# Patient Record
Sex: FEMALE | Race: WHITE | NOT HISPANIC OR LATINO | Employment: UNEMPLOYED | URBAN - METROPOLITAN AREA
[De-identification: names, ages, dates, MRNs, and addresses within clinical notes are randomized per-mention and may not be internally consistent; named-entity substitution may affect disease eponyms.]

---

## 2023-08-25 PROBLEM — E55.9 VITAMIN D DEFICIENCY: Status: ACTIVE | Noted: 2023-08-25

## 2023-08-25 PROBLEM — E03.9 HYPOTHYROIDISM: Status: ACTIVE | Noted: 2023-08-25

## 2023-08-25 PROBLEM — F41.9 ANXIETY: Status: ACTIVE | Noted: 2023-08-25

## 2023-08-25 PROBLEM — F32.A DEPRESSION: Status: ACTIVE | Noted: 2023-08-25

## 2023-08-25 PROBLEM — R40.1 CLOUDED CONSCIOUSNESS: Status: ACTIVE | Noted: 2023-08-25

## 2023-08-25 PROBLEM — C50.511 MALIGNANT NEOPLASM OF LOWER-OUTER QUADRANT OF RIGHT FEMALE BREAST (MULTI): Status: ACTIVE | Noted: 2023-08-25

## 2023-08-25 PROBLEM — R44.0 AUDITORY HALLUCINATIONS: Status: ACTIVE | Noted: 2023-08-25

## 2023-08-25 PROBLEM — Z86.73 HISTORY OF CVA (CEREBROVASCULAR ACCIDENT): Status: ACTIVE | Noted: 2023-08-25

## 2023-08-25 PROBLEM — T14.8XXA HEMATOMA: Status: ACTIVE | Noted: 2023-08-25

## 2023-08-25 PROBLEM — K21.9 GASTROESOPHAGEAL REFLUX DISEASE: Status: ACTIVE | Noted: 2023-08-25

## 2023-08-25 PROBLEM — K76.9 LIVER DISEASE, UNSPECIFIED: Status: ACTIVE | Noted: 2023-08-25

## 2023-08-25 PROBLEM — F10.939 ALCOHOL WITHDRAWAL SYNDROME (MULTI): Status: ACTIVE | Noted: 2023-08-25

## 2023-08-25 PROBLEM — R18.8 ASCITES: Status: ACTIVE | Noted: 2023-08-25

## 2023-08-25 PROBLEM — R10.9 ABDOMINAL PAIN: Status: ACTIVE | Noted: 2023-08-25

## 2023-08-25 PROBLEM — S80.10XA CONTUSION OF LOWER LEG: Status: ACTIVE | Noted: 2023-08-25

## 2023-08-25 PROBLEM — K70.31 ALCOHOLIC CIRRHOSIS OF LIVER WITH ASCITES (MULTI): Status: ACTIVE | Noted: 2023-08-25

## 2023-08-25 PROBLEM — R07.81 RIB PAIN: Status: ACTIVE | Noted: 2023-08-25

## 2023-08-25 PROBLEM — S09.90XA INJURY OF HEAD: Status: ACTIVE | Noted: 2023-08-25

## 2023-08-25 PROBLEM — D64.9 ANEMIA: Status: ACTIVE | Noted: 2023-08-25

## 2023-08-25 PROBLEM — K92.2 GASTROINTESTINAL HEMORRHAGE: Status: ACTIVE | Noted: 2023-08-25
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
## 2023-08-25 PROBLEM — S69.90XA INJURY OF FINGER: Status: ACTIVE | Noted: 2023-08-25

## 2023-08-25 PROBLEM — J90 PLEURAL EFFUSION: Status: ACTIVE | Noted: 2023-08-25

## 2023-08-25 PROBLEM — R06.82 TACHYPNEA: Status: ACTIVE | Noted: 2023-08-25

## 2023-08-25 PROBLEM — R44.3 HALLUCINATIONS: Status: ACTIVE | Noted: 2023-08-25

## 2023-08-25 PROBLEM — F17.200 TOBACCO USE DISORDER: Status: ACTIVE | Noted: 2023-08-25

## 2023-08-25 PROBLEM — C50.911 INFILTRATING DUCTAL CARCINOMA OF RIGHT BREAST (MULTI): Status: ACTIVE | Noted: 2023-08-25

## 2023-08-25 PROBLEM — K22.70 BARRETT'S ESOPHAGUS WITHOUT DYSPLASIA: Status: ACTIVE | Noted: 2023-08-25

## 2023-08-25 PROBLEM — S62.639A CLOSED FRACTURE OF DISTAL PHALANX OF FINGER: Status: ACTIVE | Noted: 2023-08-25

## 2023-08-25 PROBLEM — I50.9 HEART FAILURE (MULTI): Status: ACTIVE | Noted: 2023-08-25

## 2023-08-25 PROBLEM — K70.30 ALCOHOLIC CIRRHOSIS (MULTI): Status: ACTIVE | Noted: 2023-08-25

## 2023-08-25 PROBLEM — K74.60 HEPATIC CIRRHOSIS (MULTI): Status: ACTIVE | Noted: 2023-08-25

## 2023-08-25 PROBLEM — E78.49 OTHER HYPERLIPIDEMIA: Status: ACTIVE | Noted: 2023-08-25

## 2023-08-25 PROBLEM — M79.673 FOOT PAIN: Status: ACTIVE | Noted: 2023-08-25

## 2023-08-25 PROBLEM — I10 ESSENTIAL HYPERTENSION: Status: ACTIVE | Noted: 2023-08-25

## 2023-08-25 PROBLEM — K74.60 LIVER CIRRHOSIS (MULTI): Status: ACTIVE | Noted: 2023-08-25

## 2023-08-25 PROBLEM — K74.60 UNSPECIFIED CIRRHOSIS OF LIVER (MULTI): Status: ACTIVE | Noted: 2023-08-25

## 2023-08-25 RX ORDER — FUROSEMIDE 40 MG/1
40 TABLET ORAL DAILY
COMMUNITY
End: 2024-04-10 | Stop reason: SDUPTHER

## 2023-08-25 RX ORDER — LACTULOSE 10 G/10G
1 SOLUTION ORAL DAILY
COMMUNITY
End: 2024-04-10 | Stop reason: ALTCHOICE

## 2023-08-25 RX ORDER — LEVOTHYROXINE SODIUM 125 UG/1
62.5 TABLET ORAL DAILY
COMMUNITY
End: 2024-03-20

## 2023-08-25 RX ORDER — ACETAMINOPHEN 500 MG
1 TABLET ORAL
COMMUNITY

## 2023-08-25 RX ORDER — LEVOTHYROXINE SODIUM 75 UG/1
75 TABLET ORAL DAILY
COMMUNITY
End: 2024-04-10 | Stop reason: ALTCHOICE

## 2023-08-25 RX ORDER — PROPRANOLOL HYDROCHLORIDE 20 MG/1
20 TABLET ORAL DAILY
COMMUNITY

## 2023-08-25 RX ORDER — LACTULOSE 10 G/15ML
10 SOLUTION ORAL; RECTAL 3 TIMES DAILY
COMMUNITY

## 2023-08-25 RX ORDER — MELATONIN 5 MG
1 CAPSULE ORAL NIGHTLY PRN
COMMUNITY

## 2023-08-25 RX ORDER — DENOSUMAB 60 MG/ML
INJECTION SUBCUTANEOUS
COMMUNITY

## 2023-08-25 RX ORDER — LACTULOSE 10 G/10G
1 SOLUTION ORAL 3 TIMES DAILY
COMMUNITY
End: 2024-04-10 | Stop reason: ALTCHOICE

## 2023-08-25 RX ORDER — SPIRONOLACTONE 50 MG/1
50 TABLET, FILM COATED ORAL 2 TIMES DAILY
COMMUNITY

## 2023-08-25 RX ORDER — ANASTROZOLE 1 MG/1
1 TABLET ORAL DAILY
COMMUNITY
End: 2023-10-11 | Stop reason: SDUPTHER

## 2023-08-25 RX ORDER — OMEPRAZOLE 40 MG/1
1 CAPSULE, DELAYED RELEASE ORAL DAILY
COMMUNITY

## 2023-08-25 RX ORDER — FUROSEMIDE 20 MG/1
20 TABLET ORAL DAILY
COMMUNITY
End: 2024-04-10 | Stop reason: SDUPTHER

## 2023-08-25 RX ORDER — PROPRANOLOL HYDROCHLORIDE 80 MG/1
1 TABLET ORAL EVERY 12 HOURS
COMMUNITY
End: 2024-04-10 | Stop reason: SDUPTHER

## 2023-08-25 RX ORDER — RISPERIDONE 3 MG/1
3 TABLET ORAL DAILY
COMMUNITY
End: 2024-04-10 | Stop reason: ENTERED-IN-ERROR

## 2023-08-25 RX ORDER — FUROSEMIDE 20 MG/1
20 TABLET ORAL 2 TIMES DAILY
COMMUNITY

## 2023-09-24 VITALS — BODY MASS INDEX: 23.65 KG/M2 | WEIGHT: 128.53 LBS | HEIGHT: 62 IN

## 2023-09-24 RX ORDER — HEPARIN 100 UNIT/ML
500 SYRINGE INTRAVENOUS AS NEEDED
OUTPATIENT
Start: 2023-09-30

## 2023-09-24 RX ORDER — HEPARIN SODIUM,PORCINE/PF 10 UNIT/ML
50 SYRINGE (ML) INTRAVENOUS AS NEEDED
OUTPATIENT
Start: 2023-09-30

## 2023-09-30 RX ORDER — EPINEPHRINE 0.3 MG/.3ML
0.3 INJECTION SUBCUTANEOUS EVERY 5 MIN PRN
Status: CANCELLED | OUTPATIENT
Start: 2023-10-11

## 2023-09-30 RX ORDER — FAMOTIDINE 10 MG/ML
20 INJECTION INTRAVENOUS ONCE AS NEEDED
Status: CANCELLED | OUTPATIENT
Start: 2023-10-11

## 2023-09-30 RX ORDER — ALBUTEROL SULFATE 0.83 MG/ML
3 SOLUTION RESPIRATORY (INHALATION) AS NEEDED
Status: CANCELLED | OUTPATIENT
Start: 2023-10-11

## 2023-09-30 RX ORDER — DIPHENHYDRAMINE HYDROCHLORIDE 50 MG/ML
50 INJECTION INTRAMUSCULAR; INTRAVENOUS AS NEEDED
Status: CANCELLED | OUTPATIENT
Start: 2023-10-11

## 2023-10-03 ENCOUNTER — TELEPHONE (OUTPATIENT)
Dept: HEMATOLOGY/ONCOLOGY | Facility: CLINIC | Age: 53
End: 2023-10-03

## 2023-10-03 ENCOUNTER — HOSPITAL ENCOUNTER (OUTPATIENT)
Dept: RADIOLOGY | Facility: HOSPITAL | Age: 53
Discharge: HOME | End: 2023-10-03
Payer: MEDICARE

## 2023-10-03 ENCOUNTER — LAB (OUTPATIENT)
Dept: LAB | Facility: LAB | Age: 53
End: 2023-10-03
Payer: MEDICARE

## 2023-10-03 VITALS — HEIGHT: 62 IN | WEIGHT: 127 LBS | BODY MASS INDEX: 23.37 KG/M2

## 2023-10-03 DIAGNOSIS — C50.919 MALIGNANT NEOPLASM OF UNSPECIFIED SITE OF UNSPECIFIED FEMALE BREAST (MULTI): ICD-10-CM

## 2023-10-03 DIAGNOSIS — C50.919 MALIGNANT NEOPLASM OF UNSPECIFIED SITE OF UNSPECIFIED FEMALE BREAST (MULTI): Primary | ICD-10-CM

## 2023-10-03 DIAGNOSIS — C50.511 MALIGNANT NEOPLASM OF LOWER-OUTER QUADRANT OF RIGHT FEMALE BREAST, UNSPECIFIED ESTROGEN RECEPTOR STATUS (MULTI): ICD-10-CM

## 2023-10-03 LAB
25(OH)D3 SERPL-MCNC: 67 NG/ML (ref 31–100)
ALBUMIN SERPL-MCNC: 4.3 G/DL (ref 3.5–5)
ALP BLD-CCNC: 57 U/L (ref 35–125)
ALT SERPL-CCNC: 14 U/L (ref 5–40)
ANION GAP SERPL CALC-SCNC: 12 MMOL/L
AST SERPL-CCNC: 19 U/L (ref 5–40)
BASOPHILS # BLD AUTO: 0.03 X10*3/UL (ref 0–0.1)
BASOPHILS NFR BLD AUTO: 0.3 %
BILIRUB SERPL-MCNC: 0.3 MG/DL (ref 0.1–1.2)
BUN SERPL-MCNC: 9 MG/DL (ref 8–25)
CALCIUM SERPL-MCNC: 9.9 MG/DL (ref 8.5–10.4)
CHLORIDE SERPL-SCNC: 104 MMOL/L (ref 97–107)
CO2 SERPL-SCNC: 24 MMOL/L (ref 24–31)
CREAT SERPL-MCNC: 0.9 MG/DL (ref 0.4–1.6)
EOSINOPHIL # BLD AUTO: 0.06 X10*3/UL (ref 0–0.7)
EOSINOPHIL NFR BLD AUTO: 0.6 %
ERYTHROCYTE [DISTWIDTH] IN BLOOD BY AUTOMATED COUNT: 12.7 % (ref 11.5–14.5)
GFR SERPL CREATININE-BSD FRML MDRD: 76 ML/MIN/1.73M*2
GLUCOSE SERPL-MCNC: 104 MG/DL (ref 65–99)
HCT VFR BLD AUTO: 42.7 % (ref 36–46)
HGB BLD-MCNC: 15 G/DL (ref 12–16)
IMM GRANULOCYTES # BLD AUTO: 0.03 X10*3/UL (ref 0–0.7)
IMM GRANULOCYTES NFR BLD AUTO: 0.3 % (ref 0–0.9)
LYMPHOCYTES # BLD AUTO: 1.02 X10*3/UL (ref 1.2–4.8)
LYMPHOCYTES NFR BLD AUTO: 10.9 %
MCH RBC QN AUTO: 31.1 PG (ref 26–34)
MCHC RBC AUTO-ENTMCNC: 35.1 G/DL (ref 32–36)
MCV RBC AUTO: 88 FL (ref 80–100)
MONOCYTES # BLD AUTO: 0.63 X10*3/UL (ref 0.1–1)
MONOCYTES NFR BLD AUTO: 6.8 %
NEUTROPHILS # BLD AUTO: 7.56 X10*3/UL (ref 1.2–7.7)
NEUTROPHILS NFR BLD AUTO: 81.1 %
NRBC BLD-RTO: 0 /100 WBCS (ref 0–0)
PLATELET # BLD AUTO: 234 X10*3/UL (ref 150–450)
PMV BLD AUTO: 11.6 FL (ref 7.5–11.5)
POTASSIUM SERPL-SCNC: 4.3 MMOL/L (ref 3.4–5.1)
PROT SERPL-MCNC: 7.2 G/DL (ref 5.9–7.9)
RBC # BLD AUTO: 4.83 X10*6/UL (ref 4–5.2)
SODIUM SERPL-SCNC: 140 MMOL/L (ref 133–145)
WBC # BLD AUTO: 9.3 X10*3/UL (ref 4.4–11.3)

## 2023-10-03 PROCEDURE — 82374 ASSAY BLOOD CARBON DIOXIDE: CPT | Performed by: INTERNAL MEDICINE

## 2023-10-03 PROCEDURE — 77067 SCR MAMMO BI INCL CAD: CPT | Mod: 50

## 2023-10-03 PROCEDURE — 85025 COMPLETE CBC W/AUTO DIFF WBC: CPT | Performed by: INTERNAL MEDICINE

## 2023-10-03 PROCEDURE — 77063 BREAST TOMOSYNTHESIS BI: CPT | Mod: 50

## 2023-10-03 PROCEDURE — 36415 COLL VENOUS BLD VENIPUNCTURE: CPT

## 2023-10-03 PROCEDURE — 82306 VITAMIN D 25 HYDROXY: CPT | Performed by: INTERNAL MEDICINE

## 2023-10-10 PROBLEM — M81.8 OTHER OSTEOPOROSIS WITHOUT CURRENT PATHOLOGICAL FRACTURE: Status: ACTIVE | Noted: 2023-10-10

## 2023-10-10 NOTE — PROGRESS NOTES
Patient ID: Evette Abdul is a 53 y.o. female.    The patient presents to clinic today for her history of breast cancer.     Cancer Staging   Infiltrating ductal carcinoma of right breast (CMS/HCC)  Staging form: Breast, AJCC 8th Edition  - Clinical stage from 11/1/2021: Stage IA (cT1b, cN0, cM0, G1, ER+, UT+, HER2-) - Unsigned      Diagnostic/Therapeutic History:  pT1b pN0 (stage IA) right breast cancer  1. S/p t treatment for a 0.4 cm invasive ductal carcinoma, ER 95%, UT 90%, and HER-2-negative, grade 1. This was diagnosed in November of 2020.   2. S/p adjuvant radiation therapy, completed 2/26/21.  3. Started on anastrozole 3/2021.  4. She has osteoporosis and is on Prolia q6 months, initiated 3/2021.    History of Present Illness (HPI)/Interval History:  Ms. Abdul presents for presents today for follow-up, treatment and surveillance. She is compliant on anastrozole.     She denies any new breast cancer concerns.     She denies any chest pain or breathing issues.     She denies any vision changes or headache issues, dizziness, loss of balance or falls.     She denies any new or unexplained bone aches or pains.    She denies any skin lesions or masses.    She reports a normal appetite and normal bowel movements.    She denies any issues with sleep, fatigue, hot flashes or mood swings.     Review of Systems:  14-point ROS otherwise negative, as per HPI.    Past Medical History:   Diagnosis Date    Breast cancer (CMS/HCC)     Hx antineoplastic chemo     Other specified health status     No pertinent past surgical history    Personal history of irradiation     Personal history of malignant neoplasm of cervix uteri     History of malignant neoplasm of cervix uteri    Personal history of other diseases of the circulatory system     History of hypertension    Personal history of transient ischemic attack (TIA), and cerebral infarction without residual deficits     History of stroke       Past Surgical History:    Procedure Laterality Date    BI MAMMO GUIDED LOCALIZATION BREAST RIGHT Right 11/25/2020    BI MAMMO GUIDED LOCALIZATION BREAST RIGHT [facility]    BI US GUIDED BREAST LOCALIZATION AND BIOPSY LEFT Left 08/29/2019    BI US GUIDED BREAST LOCALIZATION AND BIOPSY LEFT [facility]    BI US GUIDED BREAST LOCALIZATION AND BIOPSY RIGHT Right 11/09/2020    BI US GUIDED BREAST LOCALIZATION AND BIOPSY RIGHT [facility]    BREAST LUMPECTOMY      US GUIDED ABDOMINAL PARACENTESIS  07/19/2014    US GUIDED ABDOMINAL PARACENTESIS [facility]    US GUIDED ABDOMINAL PARACENTESIS  07/22/2014    US GUIDED ABDOMINAL PARACENTESIS [facility]    US GUIDED ABDOMINAL PARACENTESIS  01/13/2015    US GUIDED ABDOMINAL PARACENTESIS [facility]    US GUIDED ABDOMINAL PARACENTESIS  02/03/2015    US GUIDED ABDOMINAL PARACENTESIS [facility]    US GUIDED ABDOMINAL PARACENTESIS  02/25/2015    US GUIDED ABDOMINAL PARACENTESIS [facility]       Social History     Socioeconomic History    Marital status: Single     Spouse name: Not on file    Number of children: Not on file    Years of education: Not on file    Highest education level: Not on file   Occupational History    Not on file   Tobacco Use    Smoking status: Every Day     Types: Cigarettes    Smokeless tobacco: Not on file   Substance and Sexual Activity    Alcohol use: Not on file    Drug use: Not on file    Sexual activity: Not on file   Other Topics Concern    Not on file   Social History Narrative    Not on file     Social Determinants of Health     Financial Resource Strain: Not on file   Food Insecurity: Not on file   Transportation Needs: Not on file   Physical Activity: Not on file   Stress: Not on file   Social Connections: Not on file   Intimate Partner Violence: Not on file   Housing Stability: Not on file       Allergies   Allergen Reactions    Acetaminophen Unknown    Aspirin Unknown    Codeine Unknown    Ibuprofen  Unknown    Tramadol Unknown         Current Outpatient Medications:     anastrozole (Arimidex) 1 mg tablet, Take 1 tablet (1 mg total) by mouth once daily., Disp: , Rfl:     calcium carbonate-vitamin D3 600 mg-20 mcg (800 unit) tablet, Take 1 tablet by mouth once daily with a meal., Disp: , Rfl:     denosumab (Prolia) 60 mg/mL syringe, Inject under the skin., Disp: , Rfl:     furosemide (Lasix) 20 mg tablet, Take 1 tablet (20 mg) by mouth 2 times a day., Disp: , Rfl:     furosemide (Lasix) 20 mg tablet, Take 1 tablet (20 mg) by mouth once daily., Disp: , Rfl:     furosemide (Lasix) 40 mg tablet, Take 1 tablet (40 mg) by mouth once daily., Disp: , Rfl:     lactulose (Kristalose) 10 gram packet, Take 1 packet (10 g) by mouth once daily., Disp: , Rfl:     lactulose (Kristalose) 10 gram packet, Take 1 packet (10 g) by mouth 3 times a day., Disp: , Rfl:     lactulose 20 gram/30 mL oral solution, Take 15 mL (10 g) by mouth 3 times a day., Disp: , Rfl:     levothyroxine (Synthroid, Levoxyl) 125 mcg tablet, Take 0.5 tablets (62.5 mcg) by mouth once daily., Disp: , Rfl:     levothyroxine (Synthroid, Levoxyl) 75 mcg tablet, Take 1 tablet (75 mcg) by mouth once daily., Disp: , Rfl:     melatonin 5 mg capsule, Take 1 capsule by mouth as needed at bedtime., Disp: , Rfl:     omeprazole (PriLOSEC) 40 mg DR capsule, Take 1 capsule (40 mg) by mouth once daily., Disp: , Rfl:     propranolol (Inderal) 20 mg tablet, Take 1 tablet (20 mg) by mouth once daily., Disp: , Rfl:     propranolol (Inderal) 80 mg tablet, Take 1 tablet (80 mg) by mouth every 12 hours., Disp: , Rfl:     risperiDONE (RisperDAL) 3 mg tablet, Take 1 tablet (3 mg) by mouth once daily., Disp: , Rfl:     spironolactone (Aldactone) 50 mg tablet, Take 1 tablet (50 mg) by mouth 2 times a day., Disp: , Rfl:      Objective    BSA: There is no height or weight on file to calculate BSA.  There were no vitals taken for this visit.    ECOG Performance Status:  ECOG performance  status: Asymptomatic    Physical Exam  Vitals reviewed.   Constitutional:       General: She is awake. She is not in acute distress.     Appearance: Normal appearance. She is not ill-appearing.   HENT:      Mouth/Throat:      Pharynx: Oropharynx is clear. No oropharyngeal exudate.   Eyes:      General: No scleral icterus.     Conjunctiva/sclera: Conjunctivae normal.   Neck:      Trachea: Trachea and phonation normal. No tracheal tenderness.   Cardiovascular:      Rate and Rhythm: Normal rate and regular rhythm.      Heart sounds: No murmur heard.     No friction rub. No gallop.   Pulmonary:      Effort: Pulmonary effort is normal. No respiratory distress.      Breath sounds: Normal breath sounds. No stridor. No wheezing, rhonchi or rales.   Chest:      Chest wall: No mass, lacerations, deformity or tenderness.   Breasts:     Right: Skin change (skin dimpling along incision site) present. No swelling, mass, nipple discharge or tenderness.      Left: No swelling, mass, nipple discharge, skin change or tenderness.      Comments: S/p right lumpectomy   Abdominal:      General: Abdomen is flat. There is no distension.      Palpations: Abdomen is soft. There is no mass.      Tenderness: There is no abdominal tenderness.   Lymphadenopathy:      Cervical: No cervical adenopathy.      Upper Body:      Right upper body: No supraclavicular, axillary or pectoral adenopathy.      Left upper body: No supraclavicular, axillary or pectoral adenopathy.   Skin:     General: Skin is warm and dry.      Coloration: Skin is not jaundiced.      Findings: No lesion or rash.   Neurological:      General: No focal deficit present.      Mental Status: She is alert and oriented to person, place, and time.      Motor: No weakness.      Gait: Gait normal.   Psychiatric:         Mood and Affect: Mood normal.         Thought Content: Thought content normal.         Judgment: Judgment normal.         Laboratory Data:  Lab Results   Component Value  Date    WBC 9.3 10/03/2023    HGB 15.0 10/03/2023    HCT 42.7 10/03/2023    MCV 88 10/03/2023     10/03/2023       Chemistry    Lab Results   Component Value Date/Time     10/03/2023 1504    K 4.3 10/03/2023 1504     10/03/2023 1504    CO2 24 10/03/2023 1504    BUN 9 10/03/2023 1504    CREATININE 0.90 10/03/2023 1504    Lab Results   Component Value Date/Time    CALCIUM 9.9 10/03/2023 1504    ALKPHOS 57 10/03/2023 1504    AST 19 10/03/2023 1504    ALT 14 10/03/2023 1504    BILITOT 0.3 10/03/2023 1504             Radiology:  BI mammo bilateral screening tomosynthesis  Narrative: Interpreted By:  Alana Denny,   STUDY:  BI MAMMO BILATERAL SCREENING TOMOSYNTHESIS;  10/3/2023 2:32 pm      ACCESSION NUMBER(S):  [id number]      ORDERING CLINICIAN:  PATRICE BORDEN      INDICATION:  Screening. Personal history of breast carcinoma      COMPARISON:  10/03/2022 05/02/2022, 10/01/2021      TECHNIQUE:  Digital routine screening MLO and CC projections were obtained  bilaterally. Tomosynthesis was also utilized. The images were  processed utilizing computer aided detection system.      FINDINGS:  Density:  There are areas of scattered fibroglandular tissue.      Within the posterior depth of the right breast laterally, there is  postlumpectomy change identified. Benign calcifications are present  within the right breast. On the left, there is biopsy clip seen.  Benign density within the left axillary tail is present.  No  suspicious masses or calcifications are identified.      ACR breast density category B      Impression: No mammographic evidence of malignancy.      BI-RADS CATEGORY:      BI-RADS Category:  2 Benign.  Recommendation:  Routine Screening Mammogram in 1 Year.  Recommended Date:  1 Year.  Laterality:  Bilateral.      For any future breast imaging appointments, please call [phone]).          MACRO:  None      Signed by: Alana Denny 10/3/2023 2:52 PM  Dictation workstation:    [id number]       BI mammo bilateral screening tomosynthesis 10/03/2023    Narrative  Interpreted By:  Alana Denny,  STUDY:  BI MAMMO BILATERAL SCREENING TOMOSYNTHESIS;  10/3/2023 2:32 pm    ACCESSION NUMBER(S):  [id number]    ORDERING CLINICIAN:  PATRICE BORDEN    INDICATION:  Screening. Personal history of breast carcinoma    COMPARISON:  10/03/2022 05/02/2022, 10/01/2021    TECHNIQUE:  Digital routine screening MLO and CC projections were obtained  bilaterally. Tomosynthesis was also utilized. The images were  processed utilizing computer aided detection system.    FINDINGS:  Density:  There are areas of scattered fibroglandular tissue.    Within the posterior depth of the right breast laterally, there is  postlumpectomy change identified. Benign calcifications are present  within the right breast. On the left, there is biopsy clip seen.  Benign density within the left axillary tail is present.  No  suspicious masses or calcifications are identified.    ACR breast density category B    Impression  No mammographic evidence of malignancy.    BI-RADS CATEGORY:    BI-RADS Category:  2 Benign.  Recommendation:  Routine Screening Mammogram in 1 Year.  Recommended Date:  1 Year.  Laterality:  Bilateral.    For any future breast imaging appointments, please call [phone]  ([phone]).      MACRO:  None    Signed by: Alana Denny 10/3/2023 2:52 PM  Dictation workstation:   [id number]          Assessment/Plan:    Evette Abdul is a 53 y.o. female with a history of right sided breast cancer, who presents today follow-up evaluation.    Assess/Plan SmartLinks:   Problem List Items Addressed This Visit             ICD-10-CM    Malignant neoplasm of lower-outer quadrant of right female breast (CMS/HCC) - Primary C50.511    Relevant Medications    anastrozole (Arimidex) 1 mg tablet    Other Relevant Orders    CBC and Auto Differential    Comprehensive Metabolic Panel    Infusion Appointment Request [facility]     Clinic Appointment Request Follow up; ROLANDO HUERTA     Other Visit Diagnoses         Codes    Osteopenia, unspecified location     M85.80    Relevant Orders    Infusion Appointment Request [facility]            Disposition.  - RTC 6 months, same day as prolia injection with Rolando Huerta PA-C   - She was given our contact information and instructed to call with concerns/questions in the interim.    No orders of the defined types were placed in this encounter.            Rolando Huerta PA-C  Hematology and Medical Oncology  [facility]

## 2023-10-11 ENCOUNTER — INFUSION (OUTPATIENT)
Dept: HEMATOLOGY/ONCOLOGY | Facility: CLINIC | Age: 53
End: 2023-10-11
Payer: MEDICARE

## 2023-10-11 ENCOUNTER — OFFICE VISIT (OUTPATIENT)
Dept: HEMATOLOGY/ONCOLOGY | Facility: CLINIC | Age: 53
End: 2023-10-11
Payer: MEDICARE

## 2023-10-11 VITALS
RESPIRATION RATE: 18 BRPM | TEMPERATURE: 96.6 F | DIASTOLIC BLOOD PRESSURE: 70 MMHG | HEIGHT: 62 IN | HEART RATE: 82 BPM | BODY MASS INDEX: 22.43 KG/M2 | OXYGEN SATURATION: 98 % | WEIGHT: 121.91 LBS | SYSTOLIC BLOOD PRESSURE: 102 MMHG

## 2023-10-11 DIAGNOSIS — C50.911 INFILTRATING DUCTAL CARCINOMA OF RIGHT BREAST (MULTI): ICD-10-CM

## 2023-10-11 DIAGNOSIS — C50.511 MALIGNANT NEOPLASM OF LOWER-OUTER QUADRANT OF RIGHT FEMALE BREAST, UNSPECIFIED ESTROGEN RECEPTOR STATUS (MULTI): ICD-10-CM

## 2023-10-11 DIAGNOSIS — M85.80 OSTEOPENIA, UNSPECIFIED LOCATION: ICD-10-CM

## 2023-10-11 DIAGNOSIS — C50.511 MALIGNANT NEOPLASM OF LOWER-OUTER QUADRANT OF RIGHT FEMALE BREAST, UNSPECIFIED ESTROGEN RECEPTOR STATUS (MULTI): Primary | ICD-10-CM

## 2023-10-11 PROCEDURE — 3078F DIAST BP <80 MM HG: CPT

## 2023-10-11 PROCEDURE — 99214 OFFICE O/P EST MOD 30 MIN: CPT | Mod: 25

## 2023-10-11 PROCEDURE — 2500000004 HC RX 250 GENERAL PHARMACY W/ HCPCS (ALT 636 FOR OP/ED): Mod: JZ,JG,SE | Performed by: INTERNAL MEDICINE

## 2023-10-11 PROCEDURE — 99214 OFFICE O/P EST MOD 30 MIN: CPT

## 2023-10-11 PROCEDURE — 96372 THER/PROPH/DIAG INJ SC/IM: CPT

## 2023-10-11 PROCEDURE — 3074F SYST BP LT 130 MM HG: CPT

## 2023-10-11 RX ORDER — EPINEPHRINE 0.3 MG/.3ML
0.3 INJECTION SUBCUTANEOUS EVERY 5 MIN PRN
Status: CANCELLED | OUTPATIENT
Start: 2024-04-08

## 2023-10-11 RX ORDER — FAMOTIDINE 10 MG/ML
20 INJECTION INTRAVENOUS ONCE AS NEEDED
Status: CANCELLED | OUTPATIENT
Start: 2024-04-08

## 2023-10-11 RX ORDER — ALBUTEROL SULFATE 0.83 MG/ML
3 SOLUTION RESPIRATORY (INHALATION) AS NEEDED
Status: CANCELLED | OUTPATIENT
Start: 2024-04-08

## 2023-10-11 RX ORDER — DIPHENHYDRAMINE HYDROCHLORIDE 50 MG/ML
50 INJECTION INTRAMUSCULAR; INTRAVENOUS AS NEEDED
Status: CANCELLED | OUTPATIENT
Start: 2024-04-08

## 2023-10-11 RX ORDER — ANASTROZOLE 1 MG/1
1 TABLET ORAL DAILY
Qty: 90 TABLET | Refills: 3 | Status: SHIPPED | OUTPATIENT
Start: 2023-10-11 | End: 2023-10-23 | Stop reason: SDUPTHER

## 2023-10-11 RX ADMIN — DENOSUMAB 60 MG: 60 INJECTION SUBCUTANEOUS at 15:20

## 2023-10-11 ASSESSMENT — PATIENT HEALTH QUESTIONNAIRE - PHQ9
1. LITTLE INTEREST OR PLEASURE IN DOING THINGS: NOT AT ALL
2. FEELING DOWN, DEPRESSED OR HOPELESS: NOT AT ALL
SUM OF ALL RESPONSES TO PHQ9 QUESTIONS 1 AND 2: 0

## 2023-10-11 ASSESSMENT — PAIN SCALES - GENERAL: PAINLEVEL: 0-NO PAIN

## 2023-10-11 ASSESSMENT — ENCOUNTER SYMPTOMS
LOSS OF SENSATION IN FEET: 0
DEPRESSION: 0
OCCASIONAL FEELINGS OF UNSTEADINESS: 0

## 2023-10-11 ASSESSMENT — COLUMBIA-SUICIDE SEVERITY RATING SCALE - C-SSRS
6. HAVE YOU EVER DONE ANYTHING, STARTED TO DO ANYTHING, OR PREPARED TO DO ANYTHING TO END YOUR LIFE?: NO
1. IN THE PAST MONTH, HAVE YOU WISHED YOU WERE DEAD OR WISHED YOU COULD GO TO SLEEP AND NOT WAKE UP?: NO
2. HAVE YOU ACTUALLY HAD ANY THOUGHTS OF KILLING YOURSELF?: NO

## 2023-10-11 NOTE — PROGRESS NOTES
Subjective   Patient ID: Evette Abdul is a 53 y.o. female who presents for No chief complaint on file..  HPI  Patient was last in office on 11/01/2022 for a breast examination, she had a normal exam and imaging.   Patient had a diagnositc screening mammogram on 09/17/2023 with comparison imaging to 10/03/2022 and 05/02/2022 it demonstrated; there are areas of scattered fibroglandular tissue, within the posterior depth of the right breast laterally, there is postlumpectomy changes identified. Benign calcifications are present within the right breast. No suspicious masses or calcifications are identified. Category 2.   Patient last saw Dr. Hirsch on 10/11/2023 she continues on anastrozole 1 mg daily and Prolia every 6 months.     Past Medical History:   Diagnosis Date    Ascites     Montes's syndrome     Breast cancer (CMS/HCC) 10/2020    Right Breast IDC    Hx antineoplastic chemo     Hypothyroidism     Other specified health status     No pertinent past surgical history    Personal history of irradiation     Personal history of malignant neoplasm of cervix uteri     History of malignant neoplasm of cervix uteri    Personal history of other diseases of the circulatory system     History of hypertension    Personal history of transient ischemic attack (TIA), and cerebral infarction without residual deficits     History of stroke      Past Surgical History:   Procedure Laterality Date    BI MAMMO GUIDED LOCALIZATION BREAST RIGHT Right 11/25/2020    BI MAMMO GUIDED LOCALIZATION BREAST RIGHT [facility]    BI US GUIDED BREAST LOCALIZATION AND BIOPSY LEFT Left 08/29/2019    BI US GUIDED BREAST LOCALIZATION AND BIOPSY LEFT [facility]    BI US GUIDED BREAST LOCALIZATION AND BIOPSY RIGHT Right 11/09/2020    BI US GUIDED BREAST LOCALIZATION AND BIOPSY RIGHT [facility]    BREAST LUMPECTOMY      LEG SURGERY      left leg fracture repair    US GUIDED ABDOMINAL PARACENTESIS  07/19/2014    US GUIDED  ABDOMINAL PARACENTESIS [facility]    US GUIDED ABDOMINAL PARACENTESIS  07/22/2014    US GUIDED ABDOMINAL PARACENTESIS [facility]    US GUIDED ABDOMINAL PARACENTESIS  01/13/2015    US GUIDED ABDOMINAL PARACENTESIS [facility]    US GUIDED ABDOMINAL PARACENTESIS  02/03/2015    US GUIDED ABDOMINAL PARACENTESIS [facility]    US GUIDED ABDOMINAL PARACENTESIS  02/25/2015    US GUIDED ABDOMINAL PARACENTESIS [facility]      Family History   Problem Relation Name Age of Onset    Anxiety disorder Mother      Arthritis Mother      Other (cardiac disorder) Mother      Depression Mother      Hypertension Mother      Hypothyroidism Mother      Skin cancer Mother      Other (cardiac disorder) Father      Skin cancer Father      Diabetes Other Grandmother     Lung cancer Other Aunt       Allergies   Allergen Reactions    Acetaminophen Unknown    Aspirin Unknown    Codeine Unknown    Ibuprofen Unknown    Tramadol Unknown      Review of Systems   Constitutional:  Negative for appetite change, fever and unexpected weight change.   HENT:  Negative for congestion and trouble swallowing.    Respiratory:  Negative for cough and shortness of breath.    Cardiovascular:  Negative for chest pain and palpitations.   Gastrointestinal:  Negative for abdominal pain, diarrhea and nausea.   Genitourinary:  Negative for difficulty urinating and dysuria.   Musculoskeletal:  Negative for back pain and gait problem.   Skin:  Negative for color change and rash.   Neurological:  Negative for dizziness, speech difficulty and headaches.   Hematological:  Does not bruise/bleed easily.   Psychiatric/Behavioral:  Negative for confusion and suicidal ideas.          Objective   Physical Exam  Physical Exam:  GENERAL APPEARANCE: Patient appears in no acute distress.   EYES: Sclera non-icteric, PERRLA.   ENT Normal appearance of ears and nose.   NECK/THYROID: Neck: no masses. Thyroid: no masses.   LYMPH NODES: No  cervical or supraclavicular lymphadenopathy.   CARDIOVASCULAR Heart: RRR, no murmurs; Carotid bruits: none; Peripheral edema: none.   RESPIRATORY: Lungs: Bilateral inspiratory and expiratory wheezing; no respiratory distress.   BREASTS: Exam done both in upright and supine position. On inspection no skin dimpling, no peau d'orange; Masses: none palpable in either breast.  Axillary lymphadenopathy: none.   GI (ABDOMEN) No intraabdominal mass appreciated, no hepatosplenomegaly; Hernia: none; Tenderness: none.   PSYCH: Patient oriented to time, place and person, normal affect.      Assessment/Plan   Problem List Items Addressed This Visit             ICD-10-CM    Malignant neoplasm of lower-outer quadrant of right female breast (CMS/HCC) - Primary C50.511     Patient with normal breast exam normal imaging.  Patient over 2 years out from her breast cancer diagnosis.  Therefore patient needs bilateral mammogram in 1 year with a repeat check by me at that time             BREAST HISTORY:     Patient has a history of a previous left breast core biopsy.  The patient is s/p a screening mammogram with pierce on 10/24/20 that in the right breast showed a possible developing NEW asymmetry right breast at 9 o'clock posterior depth.  Diagnostic of the right revealed a roughly 5 mm irregulary marginated focal asymmetric density betwwen 8- and 9 o'clock at the junction of the mid and posterior depths.  Ultrasound identified a solid, taller than wide, 4 x 4 x 76 mm hypoechoic mass with posterior shadowing at 8 o'clock, 5 cm DFN.    She underwent a US-guided biopsy right breast at the 8 o'clock position that revealed invasive ductal carcinoma, with multiple core biopsy pieces measuring 0.4 cm in greatest dimension, grade 1 of 3, ER/MI postive, [id number] negative.  There was concordance.   The patient is s/p right breast wire localization lumpectomy with a sentinel node biopsy on 11/25/20.  Two sentinel nodes were excised. Three lymph  nodes were negative for metastasis.  Right breast tissue revealed breast parenchyma with no residual malignancy identified. Margins were clean.  She underwent radiation therapy with Dr. Flynn from 2/5/21 - 2/26/21.  She was initiated on to anastrozole mid March, then Prolia as well as of 3/25/21.  Status post a 3D diagnostic bilateral mammogram on 10/3/22, compared to diagnostic imaging on 10/1/21, wire localization on 11/25/20, biopsy on 11/4/20, diagnostic and ultrasound on 10/30/20 and a screening on 10/24/20.  HETEROGENEOUSLY DENSE breast tissue; biopsy clip outer left breast; postop scarring upper outer right breast. No other findings.  Birads category 2.  Films were reviewed.   Normal imaging and exam with her last visit on 5/12/22. She will move to yearly imaging and exams at this point.  She is continuing with anastrozole per Dr. Hirsch, who she saw on 10/6/22.  Previous breast biopsy: yes, left breast, Menarche: 11, Age of first delivery: Nulliparous, Breastfeed: No, Menopause: 45, HRT: No, Family history of breast cancer: No, Family history of ovarian cancer: No, Family history of pancreatic cancer: No.

## 2023-10-12 ENCOUNTER — OFFICE VISIT (OUTPATIENT)
Dept: SURGERY | Facility: CLINIC | Age: 53
End: 2023-10-12
Payer: MEDICARE

## 2023-10-12 VITALS
WEIGHT: 121 LBS | HEIGHT: 63 IN | OXYGEN SATURATION: 98 % | DIASTOLIC BLOOD PRESSURE: 81 MMHG | HEART RATE: 85 BPM | RESPIRATION RATE: 21 BRPM | BODY MASS INDEX: 21.44 KG/M2 | TEMPERATURE: 97.9 F | SYSTOLIC BLOOD PRESSURE: 108 MMHG

## 2023-10-12 DIAGNOSIS — C50.511 MALIGNANT NEOPLASM OF LOWER-OUTER QUADRANT OF RIGHT BREAST OF FEMALE, ESTROGEN RECEPTOR POSITIVE (MULTI): Primary | ICD-10-CM

## 2023-10-12 DIAGNOSIS — Z17.0 MALIGNANT NEOPLASM OF LOWER-OUTER QUADRANT OF RIGHT BREAST OF FEMALE, ESTROGEN RECEPTOR POSITIVE (MULTI): Primary | ICD-10-CM

## 2023-10-12 PROCEDURE — 99214 OFFICE O/P EST MOD 30 MIN: CPT | Performed by: SURGERY

## 2023-10-12 PROCEDURE — 3074F SYST BP LT 130 MM HG: CPT | Performed by: SURGERY

## 2023-10-12 PROCEDURE — 3079F DIAST BP 80-89 MM HG: CPT | Performed by: SURGERY

## 2023-10-12 ASSESSMENT — ENCOUNTER SYMPTOMS
PALPITATIONS: 0
COLOR CHANGE: 0
DIZZINESS: 0
CONFUSION: 0
COUGH: 0
UNEXPECTED WEIGHT CHANGE: 0
TROUBLE SWALLOWING: 0
BRUISES/BLEEDS EASILY: 0
ABDOMINAL PAIN: 0
HEADACHES: 0
NAUSEA: 0
APPETITE CHANGE: 0
DIFFICULTY URINATING: 0
FEVER: 0
BACK PAIN: 0
SPEECH DIFFICULTY: 0
DYSURIA: 0
SHORTNESS OF BREATH: 0
DIARRHEA: 0

## 2023-10-12 ASSESSMENT — PAIN SCALES - GENERAL: PAINLEVEL: 0-NO PAIN

## 2023-10-12 NOTE — ASSESSMENT & PLAN NOTE
Patient with normal breast exam normal imaging.  Patient over 2 years out from her breast cancer diagnosis.  Therefore patient needs bilateral mammogram in 1 year with a repeat check by me at that time

## 2023-10-23 DIAGNOSIS — C50.511 MALIGNANT NEOPLASM OF LOWER-OUTER QUADRANT OF RIGHT FEMALE BREAST, UNSPECIFIED ESTROGEN RECEPTOR STATUS (MULTI): ICD-10-CM

## 2023-10-23 RX ORDER — ANASTROZOLE 1 MG/1
1 TABLET ORAL DAILY
Qty: 90 TABLET | Refills: 3 | Status: SHIPPED | OUTPATIENT
Start: 2023-10-23

## 2023-11-01 ENCOUNTER — TELEPHONE (OUTPATIENT)
Dept: PRIMARY CARE | Facility: CLINIC | Age: 53
End: 2023-11-01
Payer: MEDICARE

## 2023-11-01 NOTE — TELEPHONE ENCOUNTER
Pt jazm stating her pharmacy has changed to [facility] please update pharmacy

## 2024-03-19 DIAGNOSIS — E03.9 HYPOTHYROIDISM, UNSPECIFIED TYPE: ICD-10-CM

## 2024-03-20 RX ORDER — LEVOTHYROXINE SODIUM 125 UG/1
62.5 TABLET ORAL DAILY
Qty: 50 TABLET | Refills: 2 | Status: SHIPPED | OUTPATIENT
Start: 2024-03-20

## 2024-04-03 ENCOUNTER — OFFICE VISIT (OUTPATIENT)
Dept: GASTROENTEROLOGY | Facility: HOSPITAL | Age: 54
End: 2024-04-03
Payer: MEDICARE

## 2024-04-03 VITALS
DIASTOLIC BLOOD PRESSURE: 73 MMHG | WEIGHT: 122 LBS | HEIGHT: 62 IN | HEART RATE: 75 BPM | OXYGEN SATURATION: 100 % | SYSTOLIC BLOOD PRESSURE: 116 MMHG | BODY MASS INDEX: 22.45 KG/M2 | TEMPERATURE: 97.3 F

## 2024-04-03 DIAGNOSIS — K70.31 ALCOHOLIC CIRRHOSIS OF LIVER WITH ASCITES (MULTI): ICD-10-CM

## 2024-04-03 DIAGNOSIS — K22.70 BARRETT'S ESOPHAGUS WITHOUT DYSPLASIA: Primary | ICD-10-CM

## 2024-04-03 PROCEDURE — 99204 OFFICE O/P NEW MOD 45 MIN: CPT | Performed by: INTERNAL MEDICINE

## 2024-04-03 PROCEDURE — 3074F SYST BP LT 130 MM HG: CPT | Performed by: INTERNAL MEDICINE

## 2024-04-03 PROCEDURE — 3078F DIAST BP <80 MM HG: CPT | Performed by: INTERNAL MEDICINE

## 2024-04-03 PROCEDURE — 99214 OFFICE O/P EST MOD 30 MIN: CPT | Performed by: INTERNAL MEDICINE

## 2024-04-03 ASSESSMENT — PAIN SCALES - GENERAL: PAINLEVEL: 0-NO PAIN

## 2024-04-03 NOTE — PROGRESS NOTES
Subjective   Patient ID: Evette Abdul is a 53 y.o. female who presents for Abdominal Pain and ewing's.  HPI 52 yo hx of breast ca with lumpectomy and RT. In remission. Hx of alc cirrhosis. Says she has been abstinent. Last EGD showed no varices or portal HTN however she says she had gastric varices. Has known LSBE. Last surveyed in Dec 2019    Review of Systems See intake forme    Objective   Physical Exam Slight temporal wasting. Lungs cl, cor regular, did not appreciate ascites.     Assessment/Plan     Has hx of arrhythmia for which she is on rate control with inderal. Also on lasix and aldactone for hx ascites. Bianka A cirrhosis. Has LSBE. Discussed need for surveillance. Will schedule.        Fabrice Smith MD 04/03/24 12:13 PM

## 2024-04-04 DIAGNOSIS — K22.70 BARRETT'S ESOPHAGUS WITHOUT DYSPLASIA: ICD-10-CM

## 2024-04-04 DIAGNOSIS — K70.31 ALCOHOLIC CIRRHOSIS OF LIVER WITH ASCITES (MULTI): Primary | ICD-10-CM

## 2024-04-05 ENCOUNTER — LAB (OUTPATIENT)
Dept: LAB | Facility: LAB | Age: 54
End: 2024-04-05
Payer: MEDICARE

## 2024-04-05 DIAGNOSIS — K22.70 BARRETT'S ESOPHAGUS WITHOUT DYSPLASIA: Primary | ICD-10-CM

## 2024-04-05 DIAGNOSIS — C50.511 MALIGNANT NEOPLASM OF LOWER-OUTER QUADRANT OF RIGHT FEMALE BREAST, UNSPECIFIED ESTROGEN RECEPTOR STATUS (MULTI): ICD-10-CM

## 2024-04-05 DIAGNOSIS — K70.31 ALCOHOLIC CIRRHOSIS OF LIVER WITH ASCITES (MULTI): Primary | ICD-10-CM

## 2024-04-05 LAB
ALBUMIN SERPL-MCNC: 4.6 G/DL (ref 3.5–5)
ALP BLD-CCNC: 54 U/L (ref 35–125)
ALT SERPL-CCNC: 12 U/L (ref 5–40)
ANION GAP SERPL CALC-SCNC: 15 MMOL/L
AST SERPL-CCNC: 18 U/L (ref 5–40)
BASOPHILS # BLD AUTO: 0.04 X10*3/UL (ref 0–0.1)
BASOPHILS NFR BLD AUTO: 0.5 %
BILIRUB DIRECT SERPL-MCNC: <0.2 MG/DL (ref 0–0.2)
BILIRUB SERPL-MCNC: 0.6 MG/DL (ref 0.1–1.2)
BUN SERPL-MCNC: 14 MG/DL (ref 8–25)
CA-I BLD-SCNC: 1.27 MMOL/L (ref 1.1–1.33)
CALCIUM SERPL-MCNC: 10.4 MG/DL (ref 8.5–10.4)
CHLORIDE SERPL-SCNC: 100 MMOL/L (ref 97–107)
CO2 SERPL-SCNC: 25 MMOL/L (ref 24–31)
CREAT SERPL-MCNC: 0.9 MG/DL (ref 0.4–1.6)
EGFRCR SERPLBLD CKD-EPI 2021: 76 ML/MIN/1.73M*2
EOSINOPHIL # BLD AUTO: 0.1 X10*3/UL (ref 0–0.7)
EOSINOPHIL NFR BLD AUTO: 1.2 %
ERYTHROCYTE [DISTWIDTH] IN BLOOD BY AUTOMATED COUNT: 12.3 % (ref 11.5–14.5)
GGT SERPL-CCNC: 24 U/L (ref 5–55)
GLUCOSE SERPL-MCNC: 108 MG/DL (ref 65–99)
HCT VFR BLD AUTO: 45.6 % (ref 36–46)
HGB BLD-MCNC: 15.6 G/DL (ref 12–16)
IMM GRANULOCYTES # BLD AUTO: 0.03 X10*3/UL (ref 0–0.7)
IMM GRANULOCYTES NFR BLD AUTO: 0.4 % (ref 0–0.9)
LYMPHOCYTES # BLD AUTO: 1.25 X10*3/UL (ref 1.2–4.8)
LYMPHOCYTES NFR BLD AUTO: 15 %
MCH RBC QN AUTO: 31 PG (ref 26–34)
MCHC RBC AUTO-ENTMCNC: 34.2 G/DL (ref 32–36)
MCV RBC AUTO: 91 FL (ref 80–100)
MONOCYTES # BLD AUTO: 0.61 X10*3/UL (ref 0.1–1)
MONOCYTES NFR BLD AUTO: 7.3 %
NEUTROPHILS # BLD AUTO: 6.3 X10*3/UL (ref 1.2–7.7)
NEUTROPHILS NFR BLD AUTO: 75.6 %
NRBC BLD-RTO: 0 /100 WBCS (ref 0–0)
PLATELET # BLD AUTO: 219 X10*3/UL (ref 150–450)
POTASSIUM SERPL-SCNC: 4 MMOL/L (ref 3.4–5.1)
PROT SERPL-MCNC: 7.2 G/DL (ref 5.9–7.9)
RBC # BLD AUTO: 5.03 X10*6/UL (ref 4–5.2)
SODIUM SERPL-SCNC: 140 MMOL/L (ref 133–145)
WBC # BLD AUTO: 8.3 X10*3/UL (ref 4.4–11.3)

## 2024-04-05 PROCEDURE — 82330 ASSAY OF CALCIUM: CPT

## 2024-04-05 PROCEDURE — 82105 ALPHA-FETOPROTEIN SERUM: CPT

## 2024-04-05 PROCEDURE — 82977 ASSAY OF GGT: CPT

## 2024-04-05 PROCEDURE — 82248 BILIRUBIN DIRECT: CPT

## 2024-04-05 PROCEDURE — 36415 COLL VENOUS BLD VENIPUNCTURE: CPT

## 2024-04-05 PROCEDURE — 80053 COMPREHEN METABOLIC PANEL: CPT

## 2024-04-05 PROCEDURE — 85025 COMPLETE CBC W/AUTO DIFF WBC: CPT

## 2024-04-06 LAB — AFP SERPL-MCNC: <4 NG/ML (ref 0–9)

## 2024-04-08 ENCOUNTER — APPOINTMENT (OUTPATIENT)
Dept: HEMATOLOGY/ONCOLOGY | Facility: CLINIC | Age: 54
End: 2024-04-08
Payer: MEDICARE

## 2024-04-10 ENCOUNTER — OFFICE VISIT (OUTPATIENT)
Dept: HEMATOLOGY/ONCOLOGY | Facility: CLINIC | Age: 54
End: 2024-04-10
Payer: MEDICARE

## 2024-04-10 ENCOUNTER — INFUSION (OUTPATIENT)
Dept: HEMATOLOGY/ONCOLOGY | Facility: CLINIC | Age: 54
End: 2024-04-10
Payer: MEDICARE

## 2024-04-10 VITALS
HEART RATE: 68 BPM | BODY MASS INDEX: 21.7 KG/M2 | SYSTOLIC BLOOD PRESSURE: 113 MMHG | DIASTOLIC BLOOD PRESSURE: 72 MMHG | RESPIRATION RATE: 18 BRPM | TEMPERATURE: 97.3 F | WEIGHT: 117.95 LBS | OXYGEN SATURATION: 98 % | HEIGHT: 62 IN

## 2024-04-10 DIAGNOSIS — C50.511 MALIGNANT NEOPLASM OF LOWER-OUTER QUADRANT OF RIGHT FEMALE BREAST, UNSPECIFIED ESTROGEN RECEPTOR STATUS (MULTI): ICD-10-CM

## 2024-04-10 DIAGNOSIS — M85.80 OSTEOPENIA, UNSPECIFIED LOCATION: ICD-10-CM

## 2024-04-10 DIAGNOSIS — C50.911 INFILTRATING DUCTAL CARCINOMA OF RIGHT BREAST (MULTI): Primary | ICD-10-CM

## 2024-04-10 PROCEDURE — 3074F SYST BP LT 130 MM HG: CPT

## 2024-04-10 PROCEDURE — 99213 OFFICE O/P EST LOW 20 MIN: CPT

## 2024-04-10 PROCEDURE — 96372 THER/PROPH/DIAG INJ SC/IM: CPT

## 2024-04-10 PROCEDURE — 96372 THER/PROPH/DIAG INJ SC/IM: CPT | Performed by: INTERNAL MEDICINE

## 2024-04-10 PROCEDURE — 3078F DIAST BP <80 MM HG: CPT

## 2024-04-10 PROCEDURE — 2500000004 HC RX 250 GENERAL PHARMACY W/ HCPCS (ALT 636 FOR OP/ED): Mod: JZ,JG,SE | Performed by: INTERNAL MEDICINE

## 2024-04-10 RX ORDER — DIPHENHYDRAMINE HYDROCHLORIDE 50 MG/ML
50 INJECTION INTRAMUSCULAR; INTRAVENOUS AS NEEDED
OUTPATIENT
Start: 2024-10-05

## 2024-04-10 RX ORDER — DIPHENHYDRAMINE HYDROCHLORIDE 50 MG/ML
50 INJECTION INTRAMUSCULAR; INTRAVENOUS AS NEEDED
Status: DISCONTINUED | OUTPATIENT
Start: 2024-04-10 | End: 2024-04-10 | Stop reason: HOSPADM

## 2024-04-10 RX ORDER — FAMOTIDINE 10 MG/ML
20 INJECTION INTRAVENOUS ONCE AS NEEDED
OUTPATIENT
Start: 2024-10-05

## 2024-04-10 RX ORDER — EPINEPHRINE 0.3 MG/.3ML
0.3 INJECTION SUBCUTANEOUS EVERY 5 MIN PRN
Status: DISCONTINUED | OUTPATIENT
Start: 2024-04-10 | End: 2024-04-10 | Stop reason: HOSPADM

## 2024-04-10 RX ORDER — EPINEPHRINE 0.3 MG/.3ML
0.3 INJECTION SUBCUTANEOUS EVERY 5 MIN PRN
OUTPATIENT
Start: 2024-10-05

## 2024-04-10 RX ORDER — ALBUTEROL SULFATE 0.83 MG/ML
3 SOLUTION RESPIRATORY (INHALATION) AS NEEDED
Status: DISCONTINUED | OUTPATIENT
Start: 2024-04-10 | End: 2024-04-10 | Stop reason: HOSPADM

## 2024-04-10 RX ORDER — ALBUTEROL SULFATE 0.83 MG/ML
3 SOLUTION RESPIRATORY (INHALATION) AS NEEDED
OUTPATIENT
Start: 2024-10-05

## 2024-04-10 RX ORDER — FAMOTIDINE 10 MG/ML
20 INJECTION INTRAVENOUS ONCE AS NEEDED
Status: DISCONTINUED | OUTPATIENT
Start: 2024-04-10 | End: 2024-04-10 | Stop reason: HOSPADM

## 2024-04-10 RX ADMIN — DENOSUMAB 60 MG: 60 INJECTION SUBCUTANEOUS at 13:54

## 2024-04-10 ASSESSMENT — PAIN SCALES - GENERAL: PAINLEVEL: 0-NO PAIN

## 2024-04-10 NOTE — PROGRESS NOTES
Patient ID: Evette Abdul is a 53 y.o. female.    The patient presents to clinic today for her history of breast cancer.     Cancer Staging   Infiltrating ductal carcinoma of right breast (CMS/HCC)  Staging form: Breast, AJCC 8th Edition  - Clinical stage from 11/1/2021: Stage IA (cT1b, cN0, cM0, G1, ER+, NE+, HER2-) - Unsigned      Diagnostic/Therapeutic History:  pT1b pN0 (stage IA) right breast cancer  1. S/p t treatment for a 0.4 cm invasive ductal carcinoma, ER 95%, NE 90%, and HER-2-negative, grade 1. This was diagnosed in November of 2020.   2. S/p adjuvant radiation therapy, completed 2/26/21.  3. Started on anastrozole 3/2021.  4. She has osteoporosis and is on Prolia q6 months, initiated 3/2021.    History of Present Illness (HPI)/Interval History:  Ms. Abdul presents for presents today for follow-up, treatment and surveillance. She is compliant on anastrozole.     She denies any new breast cancer concerns.     She denies any chest pain or breathing issues.     She denies any vision changes or headache issues, dizziness, loss of balance or falls.     She denies any new or unexplained bone aches or pains.    She denies any skin lesions or masses.    She reports a normal appetite and normal bowel movements.    She denies any issues with sleep, fatigue, hot flashes or mood swings.     Review of Systems:  14-point ROS otherwise negative, as per HPI.    Past Medical History:   Diagnosis Date    Ascites     Montes's syndrome     Breast cancer (CMS/HCC) 10/2020    Right Breast IDC    Hx antineoplastic chemo     Hypothyroidism     Other specified health status     No pertinent past surgical history    Personal history of irradiation     Personal history of malignant neoplasm of cervix uteri     History of malignant neoplasm of cervix uteri    Personal history of other diseases of the circulatory system     History of hypertension    Personal history of transient ischemic attack (TIA), and cerebral infarction  without residual deficits     History of stroke       Past Surgical History:   Procedure Laterality Date    BI MAMMO GUIDED LOCALIZATION BREAST RIGHT Right 11/25/2020    BI MAMMO GUIDED LOCALIZATION BREAST RIGHT [facility]    BI US GUIDED BREAST LOCALIZATION AND BIOPSY LEFT Left 08/29/2019    BI US GUIDED BREAST LOCALIZATION AND BIOPSY LEFT [facility]    BI US GUIDED BREAST LOCALIZATION AND BIOPSY RIGHT Right 11/09/2020    BI US GUIDED BREAST LOCALIZATION AND BIOPSY RIGHT [facility]    BREAST LUMPECTOMY      LEG SURGERY      left leg fracture repair    US GUIDED ABDOMINAL PARACENTESIS  07/19/2014    US GUIDED ABDOMINAL PARACENTESIS [facility]    US GUIDED ABDOMINAL PARACENTESIS  07/22/2014    US GUIDED ABDOMINAL PARACENTESIS [facility]    US GUIDED ABDOMINAL PARACENTESIS  01/13/2015    US GUIDED ABDOMINAL PARACENTESIS [facility]    US GUIDED ABDOMINAL PARACENTESIS  02/03/2015    US GUIDED ABDOMINAL PARACENTESIS [facility]    US GUIDED ABDOMINAL PARACENTESIS  02/25/2015    US GUIDED ABDOMINAL PARACENTESIS [facility]       Social History     Socioeconomic History    Marital status: Single     Spouse name: Not on file    Number of children: Not on file    Years of education: Not on file    Highest education level: Not on file   Occupational History    Not on file   Tobacco Use    Smoking status: Every Day     Types: Cigarettes    Smokeless tobacco: Never   Vaping Use    Vaping status: Never Used   Substance and Sexual Activity    Alcohol use: Not Currently    Drug use: Never    Sexual activity: Defer   Other Topics Concern    Not on file   Social History Narrative    Not on file     Social Determinants of Health     Financial Resource Strain: Not on file   Food Insecurity: Not on file   Transportation Needs: Not on file   Physical Activity: Not on file   Stress: Not on file   Social Connections: Not on file   Intimate Partner Violence: Not on file  "  Housing Stability: Not on file       Allergies   Allergen Reactions    Acetaminophen Unknown    Aspirin Unknown    Codeine Unknown    Ibuprofen Unknown    Tramadol Unknown         Current Outpatient Medications:     anastrozole (Arimidex) 1 mg tablet, Take 1 tablet (1 mg total) by mouth once daily., Disp: 90 tablet, Rfl: 3    calcium carbonate-vitamin D3 600 mg-20 mcg (800 unit) tablet, Take 1 tablet by mouth once daily with breakfast., Disp: , Rfl:     denosumab (Prolia) 60 mg/mL syringe, Inject under the skin., Disp: , Rfl:     furosemide (Lasix) 20 mg tablet, Take 1 tablet (20 mg) by mouth 2 times a day., Disp: , Rfl:     lactulose 20 gram/30 mL oral solution, Take 15 mL (10 g) by mouth 3 times a day., Disp: , Rfl:     levothyroxine (Synthroid, Levoxyl) 125 mcg tablet, TAKE 1/2 TABLET DAILY, Disp: 50 tablet, Rfl: 2    melatonin 5 mg capsule, Take 1 capsule (5 mg) by mouth as needed at bedtime., Disp: , Rfl:     omeprazole (PriLOSEC) 40 mg DR capsule, Take 1 capsule (40 mg) by mouth once daily., Disp: , Rfl:     propranolol (Inderal) 20 mg tablet, Take 1 tablet (20 mg) by mouth once daily., Disp: , Rfl:     spironolactone (Aldactone) 50 mg tablet, Take 1 tablet (50 mg) by mouth 2 times a day., Disp: , Rfl:      Objective    BSA: 1.53 meters squared  /72 (BP Location: Left arm, Patient Position: Sitting, BP Cuff Size: Adult long)   Pulse 68   Temp 36.3 °C (97.3 °F) (Temporal)   Resp 18   Ht (S) 1.57 m (5' 1.81\")   Wt 53.5 kg (117 lb 15.1 oz)   SpO2 98%   BMI 21.70 kg/m²     Performance Status:  The ECOG performance scale today is ECO- Fully active, able to carry on all pre-disease performance w/o restriction.      Physical Exam  Vitals reviewed.   Constitutional:       General: She is awake. She is not in acute distress.     Appearance: Normal appearance. She is not ill-appearing.   HENT:      Mouth/Throat:      Pharynx: Oropharynx is clear. No oropharyngeal exudate.   Eyes:      General: No " scleral icterus.     Conjunctiva/sclera: Conjunctivae normal.   Neck:      Trachea: Trachea and phonation normal. No tracheal tenderness.   Cardiovascular:      Rate and Rhythm: Normal rate and regular rhythm.      Heart sounds: No murmur heard.     No friction rub. No gallop.   Pulmonary:      Effort: Pulmonary effort is normal. No respiratory distress.      Breath sounds: Normal breath sounds. No stridor. No wheezing, rhonchi or rales.   Chest:      Chest wall: No mass, lacerations, deformity or tenderness.   Breasts:     Right: Skin change (skin dimpling along incision site) present. No swelling, mass, nipple discharge or tenderness.      Left: No swelling, mass, nipple discharge, skin change or tenderness.      Comments: S/p right lumpectomy   Abdominal:      General: Abdomen is flat. There is no distension.      Palpations: Abdomen is soft. There is no mass.      Tenderness: There is no abdominal tenderness.   Lymphadenopathy:      Cervical: No cervical adenopathy.      Upper Body:      Right upper body: No supraclavicular, axillary or pectoral adenopathy.      Left upper body: No supraclavicular, axillary or pectoral adenopathy.   Skin:     General: Skin is warm and dry.      Coloration: Skin is not jaundiced.      Findings: No lesion or rash.   Neurological:      General: No focal deficit present.      Mental Status: She is alert and oriented to person, place, and time.      Motor: No weakness.      Gait: Gait normal.   Psychiatric:         Mood and Affect: Mood normal.         Thought Content: Thought content normal.         Judgment: Judgment normal.         Laboratory Data:  Lab Results   Component Value Date    WBC 8.3 04/05/2024    HGB 15.6 04/05/2024    HCT 45.6 04/05/2024    MCV 91 04/05/2024     04/05/2024       Chemistry    Lab Results   Component Value Date/Time     04/05/2024 1333    K 4.0 04/05/2024 1333     04/05/2024 1333    CO2 25 04/05/2024 1333    BUN 14 04/05/2024 1333     CREATININE 0.90 04/05/2024 1333    Lab Results   Component Value Date/Time    CALCIUM 10.4 04/05/2024 1333    ALKPHOS 54 04/05/2024 1333    AST 18 04/05/2024 1333    ALT 12 04/05/2024 1333    BILITOT 0.6 04/05/2024 1333             Radiology:  BI mammo bilateral screening tomosynthesis  Narrative: Interpreted By:  Alana Denny,   STUDY:  BI MAMMO BILATERAL SCREENING TOMOSYNTHESIS;  10/3/2023 2:32 pm      ACCESSION NUMBER(S):  [id number]      ORDERING CLINICIAN:  PATRICE BORDEN      INDICATION:  Screening. Personal history of breast carcinoma      COMPARISON:  10/03/2022 05/02/2022, 10/01/2021      TECHNIQUE:  Digital routine screening MLO and CC projections were obtained  bilaterally. Tomosynthesis was also utilized. The images were  processed utilizing computer aided detection system.      FINDINGS:  Density:  There are areas of scattered fibroglandular tissue.      Within the posterior depth of the right breast laterally, there is  postlumpectomy change identified. Benign calcifications are present  within the right breast. On the left, there is biopsy clip seen.  Benign density within the left axillary tail is present.  No  suspicious masses or calcifications are identified.      ACR breast density category B      Impression: No mammographic evidence of malignancy.      BI-RADS CATEGORY:      BI-RADS Category:  2 Benign.  Recommendation:  Routine Screening Mammogram in 1 Year.  Recommended Date:  1 Year.  Laterality:  Bilateral.      For any future breast imaging appointments, please call [phone]).          MACRO:  None      Signed by: Alana Denny 10/3/2023 2:52 PM  Dictation workstation:   [id number]       BI mammo bilateral screening tomosynthesis 10/03/2023    Narrative  Interpreted By:  Alana Denny,  STUDY:  BI MAMMO BILATERAL SCREENING TOMOSYNTHESIS;  10/3/2023 2:32 pm    ACCESSION NUMBER(S):  [id number]    ORDERING CLINICIAN:  PATRICE BORDEN    INDICATION:  Screening. Personal history of breast  carcinoma    COMPARISON:  10/03/2022 05/02/2022, 10/01/2021    TECHNIQUE:  Digital routine screening MLO and CC projections were obtained  bilaterally. Tomosynthesis was also utilized. The images were  processed utilizing computer aided detection system.    FINDINGS:  Density:  There are areas of scattered fibroglandular tissue.    Within the posterior depth of the right breast laterally, there is  postlumpectomy change identified. Benign calcifications are present  within the right breast. On the left, there is biopsy clip seen.  Benign density within the left axillary tail is present.  No  suspicious masses or calcifications are identified.    ACR breast density category B    Impression  No mammographic evidence of malignancy.    BI-RADS CATEGORY:    BI-RADS Category:  2 Benign.  Recommendation:  Routine Screening Mammogram in 1 Year.  Recommended Date:  1 Year.  Laterality:  Bilateral.    For any future breast imaging appointments, please call [phone]  ([phone]).      MACRO:  None    Signed by: Alana Denny 10/3/2023 2:52 PM  Dictation workstation:   [id number]          Assessment/Plan:    Evette Abdul is a 53 y.o. female with a history of right sided breast cancer, who presents today follow-up evaluation.    Right breast cancer   - Continue anastrozole   - Mammogram scheduled for 10/2024 with follow up with Dr. Newsome     There is no evidence of breast cancer recurrence based on her clinical exam today.     Osteopenia   - Prolia today, next due 10/9/2024   - Next DEXA spring 2025      Assess/Plan SmartLinks:   Problem List Items Addressed This Visit             ICD-10-CM    Infiltrating ductal carcinoma of right breast (CMS/HCC) - Primary C50.911    Malignant neoplasm of lower-outer quadrant of right female breast (CMS/HCC) C50.511         Disposition.  - RTC 6 months, same day as prolia injection with Nellie Diehl PA-C   - She was given our contact information and instructed to call with  concerns/questions in the interim.    No orders of the defined types were placed in this encounter.            Nellie Diehl PA-C  Hematology and Medical Oncology  [facility]

## 2024-04-21 NOTE — PROGRESS NOTES
This is a 53 year old female for ROUTINE MEDICAL with hx right breast lumpectomy about 3 years ago on PROLIA due to CANCER MEDS causing OSTEOPENIA.  Remains in care of BREAST CLINIC center.      ROS is NEG for HEADACHE, NAUSEA, VOMITING, DIARRHEA, CHEST PAIN, SOB, and BLEEDING and as further REVIEWED BELOW.    Subjective   Evette Abdul is a 53 y.o. female who presents for Annual Exam (Physical no other concerns).    HPI:    Per nursing intake, pt here for Annual Exam (Physical no other concerns)       Review of systems is essentially negative for all systems except for any identified issues in HPI above.    Objective     /68   Pulse 64   Temp 36.3 °C (97.4 °F)   SpO2 97%      COMPLETE PHYSICAL EXAM    GENERAL           General Appearance: pleasant, well-appearing, well-developed, well-hydrated, well-nourished, well-groomed, .        HEENT           NECK supple, Neg for adneopathy no thyroid enlargement or nodules, Oropharynx normal no exudates.        EYES           Pupils: PERRLA, no photophobia.        HEART           Rate and Rhythm regular rate and rhythm. Heart sounds: normal S1S2. Murmurs: none.        LUNGS           Effort: Normal chest wall, no pectus, Normal air entry all fields, Clear to IPPA, RR<16 with no use of accessory muscles.        BREASTS           Bilaterally: no masses, no nipple retraction, no nipple discharge, no abnormal skin changes. Axilla: no lymphadenopathy.        BACK           General: unremarkable, no spinal tenderness or rashes.        ABDOMEN           General: Normal to inspection, neg for LKKS or masses and BSs heard in all quadrants                LYMPHATICS           Cervical: none. Axillary: none.        MUSCULOSKELETAL           gross abnormalities no gross abnormalities, no joint redness or swelling.        EXTREMITIES           Varicose veins: not present. Pulses: 2+ bilateral. Clubbing: none. Cyanosis: no.        NEUROLOGICAL           Orientation: alert and  oriented x 3. Grossly normal: yes. Plantars: downgoing bilaterally. Muscle Bulk: normal . Cranial Nerves: CN's II-XII grossly intact.        PSYCHOLOGY           Affect: appropriate. Mood: pleasant.     Assessment/Plan   Problem List Items Addressed This Visit    None  Visit Diagnoses       Routine medical exam    -  Primary    Screening mammogram for breast cancer        Screening for colorectal cancer        Screening, lipid        Immunization counseling        Health counseling                FOLLOW UP:   YEARLY for ROUTINE MEDICAL and PRN for other issues as discussed in visit         Paola Avilez M.D.

## 2024-04-22 ASSESSMENT — PROMIS GLOBAL HEALTH SCALE
RATE_GENERAL_HEALTH: VERY GOOD
RATE_AVERAGE_PAIN: 0
RATE_MENTAL_HEALTH: EXCELLENT
EMOTIONAL_PROBLEMS: SOMETIMES
CARRYOUT_PHYSICAL_ACTIVITIES: COMPLETELY
RATE_PHYSICAL_HEALTH: VERY GOOD
RATE_QUALITY_OF_LIFE: VERY GOOD
RATE_SOCIAL_SATISFACTION: EXCELLENT
CARRYOUT_SOCIAL_ACTIVITIES: EXCELLENT

## 2024-04-29 ENCOUNTER — OFFICE VISIT (OUTPATIENT)
Dept: PRIMARY CARE | Facility: CLINIC | Age: 54
End: 2024-04-29
Payer: MEDICARE

## 2024-04-29 VITALS
OXYGEN SATURATION: 97 % | DIASTOLIC BLOOD PRESSURE: 68 MMHG | HEART RATE: 64 BPM | SYSTOLIC BLOOD PRESSURE: 108 MMHG | TEMPERATURE: 97.4 F

## 2024-04-29 DIAGNOSIS — Z12.12 SCREENING FOR COLORECTAL CANCER: ICD-10-CM

## 2024-04-29 DIAGNOSIS — Z00.00 ROUTINE MEDICAL EXAM: Primary | ICD-10-CM

## 2024-04-29 DIAGNOSIS — Z12.11 SCREENING FOR COLORECTAL CANCER: ICD-10-CM

## 2024-04-29 DIAGNOSIS — E03.9 HYPOTHYROIDISM, UNSPECIFIED TYPE: ICD-10-CM

## 2024-04-29 DIAGNOSIS — M85.80 OSTEOPENIA, UNSPECIFIED LOCATION: ICD-10-CM

## 2024-04-29 DIAGNOSIS — Z71.9 HEALTH COUNSELING: ICD-10-CM

## 2024-04-29 DIAGNOSIS — Z13.220 SCREENING, LIPID: ICD-10-CM

## 2024-04-29 DIAGNOSIS — Z71.85 IMMUNIZATION COUNSELING: ICD-10-CM

## 2024-04-29 DIAGNOSIS — Z12.31 SCREENING MAMMOGRAM FOR BREAST CANCER: ICD-10-CM

## 2024-04-29 PROCEDURE — 99396 PREV VISIT EST AGE 40-64: CPT | Performed by: FAMILY MEDICINE

## 2024-04-29 PROCEDURE — 3078F DIAST BP <80 MM HG: CPT | Performed by: FAMILY MEDICINE

## 2024-04-29 PROCEDURE — 3074F SYST BP LT 130 MM HG: CPT | Performed by: FAMILY MEDICINE

## 2024-04-29 PROCEDURE — 90677 PCV20 VACCINE IM: CPT | Performed by: FAMILY MEDICINE

## 2024-04-29 ASSESSMENT — PAIN SCALES - GENERAL: PAINLEVEL: 0-NO PAIN

## 2024-04-29 ASSESSMENT — PATIENT HEALTH QUESTIONNAIRE - PHQ9
2. FEELING DOWN, DEPRESSED OR HOPELESS: NOT AT ALL
SUM OF ALL RESPONSES TO PHQ9 QUESTIONS 1 AND 2: 0
1. LITTLE INTEREST OR PLEASURE IN DOING THINGS: NOT AT ALL

## 2024-05-14 ENCOUNTER — LAB (OUTPATIENT)
Dept: LAB | Facility: LAB | Age: 54
End: 2024-05-14
Payer: MEDICARE

## 2024-05-14 DIAGNOSIS — Z13.220 SCREENING, LIPID: ICD-10-CM

## 2024-05-14 DIAGNOSIS — Z00.00 ROUTINE MEDICAL EXAM: ICD-10-CM

## 2024-05-14 LAB
ALBUMIN SERPL-MCNC: 4.6 G/DL (ref 3.5–5)
ALP BLD-CCNC: 58 U/L (ref 35–125)
ALT SERPL-CCNC: 12 U/L (ref 5–40)
ANION GAP SERPL CALC-SCNC: 15 MMOL/L
APPEARANCE UR: CLEAR
AST SERPL-CCNC: 18 U/L (ref 5–40)
BILIRUB SERPL-MCNC: 0.6 MG/DL (ref 0.1–1.2)
BILIRUB UR STRIP.AUTO-MCNC: NEGATIVE MG/DL
BUN SERPL-MCNC: 15 MG/DL (ref 8–25)
CALCIUM SERPL-MCNC: 9.8 MG/DL (ref 8.5–10.4)
CHLORIDE SERPL-SCNC: 97 MMOL/L (ref 97–107)
CHOLEST SERPL-MCNC: 220 MG/DL (ref 133–200)
CHOLEST/HDLC SERPL: 5.5 {RATIO}
CO2 SERPL-SCNC: 25 MMOL/L (ref 24–31)
COLOR UR: COLORLESS
CREAT SERPL-MCNC: 1 MG/DL (ref 0.4–1.6)
EGFRCR SERPLBLD CKD-EPI 2021: 67 ML/MIN/1.73M*2
GLUCOSE SERPL-MCNC: 108 MG/DL (ref 65–99)
GLUCOSE UR STRIP.AUTO-MCNC: NORMAL MG/DL
HDLC SERPL-MCNC: 40 MG/DL
KETONES UR STRIP.AUTO-MCNC: NEGATIVE MG/DL
LDLC SERPL CALC-MCNC: 149 MG/DL (ref 65–130)
LEUKOCYTE ESTERASE UR QL STRIP.AUTO: NEGATIVE
NITRITE UR QL STRIP.AUTO: NEGATIVE
PH UR STRIP.AUTO: 7 [PH]
POTASSIUM SERPL-SCNC: 4.3 MMOL/L (ref 3.4–5.1)
PROT SERPL-MCNC: 7.7 G/DL (ref 5.9–7.9)
PROT UR STRIP.AUTO-MCNC: NEGATIVE MG/DL
RBC # UR STRIP.AUTO: NEGATIVE /UL
SODIUM SERPL-SCNC: 137 MMOL/L (ref 133–145)
SP GR UR STRIP.AUTO: 1.01
TRIGL SERPL-MCNC: 157 MG/DL (ref 40–150)
TSH SERPL DL<=0.05 MIU/L-ACNC: 1.15 MIU/L (ref 0.27–4.2)
UROBILINOGEN UR STRIP.AUTO-MCNC: NORMAL MG/DL

## 2024-05-14 PROCEDURE — 81003 URINALYSIS AUTO W/O SCOPE: CPT

## 2024-05-14 PROCEDURE — 84443 ASSAY THYROID STIM HORMONE: CPT

## 2024-05-14 PROCEDURE — 80061 LIPID PANEL: CPT

## 2024-05-14 PROCEDURE — 80053 COMPREHEN METABOLIC PANEL: CPT

## 2024-05-14 PROCEDURE — 36415 COLL VENOUS BLD VENIPUNCTURE: CPT

## 2024-05-14 PROCEDURE — 86803 HEPATITIS C AB TEST: CPT

## 2024-05-15 LAB
HCV AB SER QL: NONREACTIVE
HOLD SPECIMEN: NORMAL

## 2024-05-23 ENCOUNTER — HOSPITAL ENCOUNTER (OUTPATIENT)
Dept: GASTROENTEROLOGY | Facility: HOSPITAL | Age: 54
Setting detail: OUTPATIENT SURGERY
Discharge: HOME | End: 2024-05-23
Payer: MEDICARE

## 2024-05-23 VITALS
RESPIRATION RATE: 14 BRPM | DIASTOLIC BLOOD PRESSURE: 92 MMHG | WEIGHT: 120 LBS | BODY MASS INDEX: 22.08 KG/M2 | HEIGHT: 62 IN | TEMPERATURE: 96.8 F | OXYGEN SATURATION: 98 % | HEART RATE: 62 BPM | SYSTOLIC BLOOD PRESSURE: 115 MMHG

## 2024-05-23 DIAGNOSIS — K22.70 BARRETT'S ESOPHAGUS WITHOUT DYSPLASIA: ICD-10-CM

## 2024-05-23 PROCEDURE — 7100000009 HC PHASE TWO TIME - INITIAL BASE CHARGE

## 2024-05-23 PROCEDURE — 43239 EGD BIOPSY SINGLE/MULTIPLE: CPT | Performed by: INTERNAL MEDICINE

## 2024-05-23 PROCEDURE — 2500000004 HC RX 250 GENERAL PHARMACY W/ HCPCS (ALT 636 FOR OP/ED): Mod: SE | Performed by: INTERNAL MEDICINE

## 2024-05-23 PROCEDURE — G0500 MOD SEDAT ENDO SERVICE >5YRS: HCPCS | Performed by: INTERNAL MEDICINE

## 2024-05-23 PROCEDURE — 3700000012 HC SEDATION LEVEL 5+ TIME - INITIAL 15 MINUTES 5/> YEARS

## 2024-05-23 PROCEDURE — 7100000010 HC PHASE TWO TIME - EACH INCREMENTAL 1 MINUTE

## 2024-05-23 PROCEDURE — 88305 TISSUE EXAM BY PATHOLOGIST: CPT | Mod: TC,SUR | Performed by: INTERNAL MEDICINE

## 2024-05-23 RX ORDER — MEPERIDINE HYDROCHLORIDE 50 MG/ML
INJECTION INTRAMUSCULAR; INTRAVENOUS; SUBCUTANEOUS AS NEEDED
Status: COMPLETED | OUTPATIENT
Start: 2024-05-23 | End: 2024-05-23

## 2024-05-23 RX ORDER — DIPHENHYDRAMINE HYDROCHLORIDE 50 MG/ML
INJECTION INTRAMUSCULAR; INTRAVENOUS AS NEEDED
Status: COMPLETED | OUTPATIENT
Start: 2024-05-23 | End: 2024-05-23

## 2024-05-23 RX ORDER — MIDAZOLAM HYDROCHLORIDE 1 MG/ML
INJECTION, SOLUTION INTRAMUSCULAR; INTRAVENOUS AS NEEDED
Status: COMPLETED | OUTPATIENT
Start: 2024-05-23 | End: 2024-05-23

## 2024-05-23 RX ADMIN — MIDAZOLAM 2 MG: 1 INJECTION INTRAMUSCULAR; INTRAVENOUS at 15:16

## 2024-05-23 RX ADMIN — MIDAZOLAM 1 MG: 1 INJECTION INTRAMUSCULAR; INTRAVENOUS at 15:19

## 2024-05-23 RX ADMIN — MEPERIDINE HYDROCHLORIDE 25 MG: 50 INJECTION INTRAMUSCULAR; INTRAVENOUS; SUBCUTANEOUS at 15:18

## 2024-05-23 RX ADMIN — DIPHENHYDRAMINE HYDROCHLORIDE 25 MG: 50 INJECTION, SOLUTION INTRAMUSCULAR; INTRAVENOUS at 15:30

## 2024-05-23 RX ADMIN — MEPERIDINE HYDROCHLORIDE 50 MG: 50 INJECTION INTRAMUSCULAR; INTRAVENOUS; SUBCUTANEOUS at 15:15

## 2024-05-23 ASSESSMENT — PAIN SCALES - GENERAL
PAINLEVEL_OUTOF10: 0 - NO PAIN

## 2024-05-23 ASSESSMENT — PAIN - FUNCTIONAL ASSESSMENT
PAIN_FUNCTIONAL_ASSESSMENT: 0-10

## 2024-05-23 ASSESSMENT — COLUMBIA-SUICIDE SEVERITY RATING SCALE - C-SSRS
6. HAVE YOU EVER DONE ANYTHING, STARTED TO DO ANYTHING, OR PREPARED TO DO ANYTHING TO END YOUR LIFE?: NO
2. HAVE YOU ACTUALLY HAD ANY THOUGHTS OF KILLING YOURSELF?: NO

## 2024-05-23 NOTE — H&P
Subjective     History of Present Illness:   Evette Abdul is a 53 y.o. female who presents to endoscopy    Physical Exam  General: not in acute distress  CV: regular rate and rhythm  Resp: non-labored breathing

## 2024-05-30 NOTE — PROGRESS NOTES
"This is a 53 year old female for FU VISIT and had Tsh normal recently:      Lab Results   Component Value Date    HGBA1C 5.2 06/05/2024         A1c today  Also discussion re lipids:  LOW HDL and exercies  WILL TRY ATORVASTATIN 10 mg daily       Lab Results   Component Value Date    CHOL 220 (H) 05/14/2024    CHOL 163 09/22/2021    CHOL 144 08/24/2020     Lab Results   Component Value Date    HDL 40.0 (L) 05/14/2024    HDL 50 (L) 09/22/2021    HDL 38 (L) 08/24/2020     Lab Results   Component Value Date    LDLCALC 149 (H) 05/14/2024    LDLCALC 94 09/22/2021    LDLCALC 91 08/24/2020     Lab Results   Component Value Date    TRIG 157 (H) 05/14/2024    TRIG 96 09/22/2021    TRIG 76 08/24/2020     No components found for: \"CHOLHDL\"      CMP wnl OAR to baseline with glucose at 108 as it has been in past  but A1c is remotely done in 2021 so offer A1c please:  Lab Results       Component                Value               Date                       HGBA1C                   5.5                 09/22/2021          LIPID panel is suboptimal so recommend a DEDICATED FU visit non urgent and we may discuss lipid management and test on site for A1c if she wishes;   UA wnl and HEP C NON REAC; TSH wnl.    Lab Results   Component Value Date    TSH 1.15 05/14/2024           ROS is NEG for HEADACHE, NAUSEA, VOMITING, DIARRHEA, CHEST PAIN, SOB, and BLEEDING and as further REVIEWED BELOW.    Subjective   Evette Abdul is a 53 y.o. female who presents for No chief complaint on file..    HPI:    Per nursing intake, pt here for No chief complaint on file.       Review of systems is essentially negative for all systems except for any identified issues in HPI above.    Objective     There were no vitals taken for this visit.     Physical Examination:       GENERAL           General Appearance: well-appearing, well-developed, well-hydrated, well-nourished, no acute distress.        HEENT           NECK supple, no masses or thyromegaly, no " carotid bruit.        EYES           Extraocular Movements: normal, bilateral eyes RENNY, no conjunctival injection.        HEART           Rate and Rhythm regular rate and rhythm. Heart sounds: normal S1S2, no S3 or S4. Murmurs: none.        CHEST           Breath sounds: Clear to IPPA, RR<16 no use of accessory muscles.        ABDOMEN           General: Neg for LKKS or masses, no scleral icterus or jaundice.        MUSCULOSKELETAL           Joints Demonstration: Neg for erythema, swelling or joint deformities. gross abnormalities no gross abnormalities.        EXTREMITIES           Lower Extremities: Neg for cyanosis, clubbing or edema.       Assessment/Plan   Problem List Items Addressed This Visit    None      FOLLOW UP:  PRN and as specified above         Paola Avilez M.D.

## 2024-06-05 ENCOUNTER — OFFICE VISIT (OUTPATIENT)
Dept: PRIMARY CARE | Facility: CLINIC | Age: 54
End: 2024-06-05
Payer: MEDICARE

## 2024-06-05 VITALS
OXYGEN SATURATION: 100 % | DIASTOLIC BLOOD PRESSURE: 85 MMHG | HEART RATE: 63 BPM | TEMPERATURE: 97 F | SYSTOLIC BLOOD PRESSURE: 135 MMHG | BODY MASS INDEX: 21.69 KG/M2 | WEIGHT: 118.6 LBS

## 2024-06-05 DIAGNOSIS — R73.09 ELEVATED GLUCOSE: ICD-10-CM

## 2024-06-05 DIAGNOSIS — E03.9 HYPOTHYROIDISM, UNSPECIFIED TYPE: Primary | ICD-10-CM

## 2024-06-05 DIAGNOSIS — E78.2 COMBINED HYPERLIPIDEMIA: ICD-10-CM

## 2024-06-05 LAB — POC HEMOGLOBIN A1C: 5.2 % (ref 4.2–6.5)

## 2024-06-05 PROCEDURE — 83036 HEMOGLOBIN GLYCOSYLATED A1C: CPT | Mod: MUE | Performed by: FAMILY MEDICINE

## 2024-06-05 PROCEDURE — 99214 OFFICE O/P EST MOD 30 MIN: CPT | Performed by: FAMILY MEDICINE

## 2024-06-05 PROCEDURE — 4004F PT TOBACCO SCREEN RCVD TLK: CPT | Performed by: FAMILY MEDICINE

## 2024-06-05 PROCEDURE — 3075F SYST BP GE 130 - 139MM HG: CPT | Performed by: FAMILY MEDICINE

## 2024-06-05 PROCEDURE — 3079F DIAST BP 80-89 MM HG: CPT | Performed by: FAMILY MEDICINE

## 2024-06-05 RX ORDER — ATORVASTATIN CALCIUM 10 MG/1
10 TABLET, FILM COATED ORAL DAILY
Qty: 30 TABLET | Refills: 11 | Status: SHIPPED | OUTPATIENT
Start: 2024-06-05 | End: 2025-06-05

## 2024-06-05 ASSESSMENT — PAIN SCALES - GENERAL: PAINLEVEL: 0-NO PAIN

## 2024-06-05 ASSESSMENT — COLUMBIA-SUICIDE SEVERITY RATING SCALE - C-SSRS
1. IN THE PAST MONTH, HAVE YOU WISHED YOU WERE DEAD OR WISHED YOU COULD GO TO SLEEP AND NOT WAKE UP?: NO
2. HAVE YOU ACTUALLY HAD ANY THOUGHTS OF KILLING YOURSELF?: NO
6. HAVE YOU EVER DONE ANYTHING, STARTED TO DO ANYTHING, OR PREPARED TO DO ANYTHING TO END YOUR LIFE?: NO

## 2024-06-07 LAB
LABORATORY COMMENT REPORT: NORMAL
PATH REPORT.FINAL DX SPEC: NORMAL
PATH REPORT.GROSS SPEC: NORMAL
PATH REPORT.TOTAL CANCER: NORMAL

## 2024-06-12 ENCOUNTER — TELEPHONE (OUTPATIENT)
Dept: HEMATOLOGY/ONCOLOGY | Facility: CLINIC | Age: 54
End: 2024-06-12
Payer: MEDICARE

## 2024-06-12 DIAGNOSIS — C50.511 MALIGNANT NEOPLASM OF LOWER-OUTER QUADRANT OF RIGHT FEMALE BREAST, UNSPECIFIED ESTROGEN RECEPTOR STATUS (MULTI): ICD-10-CM

## 2024-06-13 RX ORDER — ANASTROZOLE 1 MG/1
1 TABLET ORAL DAILY
Qty: 90 TABLET | Refills: 3 | Status: SHIPPED | OUTPATIENT
Start: 2024-06-13

## 2024-06-23 NOTE — PROGRESS NOTES
"This is a 53 year old female for EVAL of INSOMNIA and is in care of Psychiatrist who Rx TRAZODONE and is urged to remain their care.  SHE STATES the TRAZODONE 'does nothing\"    Urged NOT to use zolpidem due to risks of habit forming, amnesia and NIGHT DRIVING and other issues.  She states she had a reaction to this in the remote past.    WILL OFFER SLEEP MEDICINE evaluation    ALSO new issue with LEFT ANKLE pain and old strain x 2 weeks      ROS is NEG for HEADACHE, NAUSEA, VOMITING, DIARRHEA, CHEST PAIN, SOB, and BLEEDING and as further REVIEWED BELOW.    Subjective   Evette Abdul is a 53 y.o. female who presents for Insomnia (0-4 hrs of sleep past two weeks has been very bad. Sleep routine and OTC ineffective. Psych MD prescribe Trazodone which also was ineffective per pt. Sleep issues have been on going for years per pt just worse as of late. ).    HPI:    Per nursing intake, pt here for Insomnia (0-4 hrs of sleep past two weeks has been very bad. Sleep routine and OTC ineffective. Psych MD prescribe Trazodone which also was ineffective per pt. Sleep issues have been on going for years per pt just worse as of late. )       Review of systems is essentially negative for all systems except for any identified issues in HPI above.    Objective     /82   Pulse 93   Temp 36.3 °C (97.3 °F)   Wt 53.1 kg (117 lb)   SpO2 99%   BMI 21.40 kg/m²      Physical Examination:       GENERAL           General Appearance: well-appearing, well-developed, well-hydrated, well-nourished, no acute distress.        HEENT           NECK supple, no masses or thyromegaly, no carotid bruit.        EYES           Extraocular Movements: normal, bilateral eyes RENNY, no conjunctival injection.        HEART           Rate and Rhythm regular rate and rhythm. Heart sounds: normal S1S2, no S3 or S4. Murmurs: none.        CHEST           Breath sounds: Clear to IPPA, RR<16 no use of accessory muscles.        ABDOMEN           General: Neg " for LKKS or masses, no scleral icterus or jaundice.        MUSCULOSKELETAL           Joints Demonstration: Neg for erythema, swelling or joint deformities. gross abnormalities no gross abnormalities.        EXTREMITIES           Lower Extremities: Neg for cyanosis, clubbing or edema.       Assessment/Plan   Problem List Items Addressed This Visit       Alcoholic cirrhosis (Multi)     Other Visit Diagnoses       Insomnia, unspecified type    -  Primary    History of alcohol abuse                FOLLOW UP:  PRN and as specified above         Paola Avilez M.D.

## 2024-06-27 ENCOUNTER — HOSPITAL ENCOUNTER (OUTPATIENT)
Dept: RADIOLOGY | Facility: CLINIC | Age: 54
Discharge: HOME | End: 2024-06-27
Payer: MEDICARE

## 2024-06-27 ENCOUNTER — OFFICE VISIT (OUTPATIENT)
Dept: PRIMARY CARE | Facility: CLINIC | Age: 54
End: 2024-06-27
Payer: MEDICARE

## 2024-06-27 VITALS
HEART RATE: 93 BPM | TEMPERATURE: 97.3 F | DIASTOLIC BLOOD PRESSURE: 82 MMHG | OXYGEN SATURATION: 99 % | WEIGHT: 117 LBS | SYSTOLIC BLOOD PRESSURE: 122 MMHG | BODY MASS INDEX: 21.4 KG/M2

## 2024-06-27 DIAGNOSIS — K70.30 ALCOHOLIC CIRRHOSIS, UNSPECIFIED WHETHER ASCITES PRESENT (MULTI): ICD-10-CM

## 2024-06-27 DIAGNOSIS — G47.00 INSOMNIA, UNSPECIFIED TYPE: Primary | ICD-10-CM

## 2024-06-27 DIAGNOSIS — M25.572 LEFT ANKLE PAIN, UNSPECIFIED CHRONICITY: ICD-10-CM

## 2024-06-27 DIAGNOSIS — F10.11 HISTORY OF ALCOHOL ABUSE: ICD-10-CM

## 2024-06-27 PROCEDURE — 99214 OFFICE O/P EST MOD 30 MIN: CPT | Performed by: FAMILY MEDICINE

## 2024-06-27 PROCEDURE — 73600 X-RAY EXAM OF ANKLE: CPT | Mod: LT

## 2024-06-27 PROCEDURE — 3079F DIAST BP 80-89 MM HG: CPT | Performed by: FAMILY MEDICINE

## 2024-06-27 PROCEDURE — 3074F SYST BP LT 130 MM HG: CPT | Performed by: FAMILY MEDICINE

## 2024-06-27 PROCEDURE — 4004F PT TOBACCO SCREEN RCVD TLK: CPT | Performed by: FAMILY MEDICINE

## 2024-06-27 RX ORDER — TRAZODONE HYDROCHLORIDE 50 MG/1
50 TABLET ORAL NIGHTLY
COMMUNITY
End: 2024-06-27

## 2024-06-27 RX ORDER — TRAZODONE HYDROCHLORIDE 100 MG/1
100 TABLET ORAL NIGHTLY PRN
Qty: 30 TABLET | Refills: 1 | Status: SHIPPED | OUTPATIENT
Start: 2024-06-27 | End: 2024-08-26

## 2024-06-27 ASSESSMENT — PAIN SCALES - GENERAL: PAINLEVEL: 2

## 2024-06-27 ASSESSMENT — ENCOUNTER SYMPTOMS
OCCASIONAL FEELINGS OF UNSTEADINESS: 0
LOSS OF SENSATION IN FEET: 0
DEPRESSION: 0

## 2024-07-01 ENCOUNTER — TELEPHONE (OUTPATIENT)
Dept: PRIMARY CARE | Facility: CLINIC | Age: 54
End: 2024-07-01
Payer: MEDICARE

## 2024-07-05 ENCOUNTER — OFFICE VISIT (OUTPATIENT)
Dept: ORTHOPEDIC SURGERY | Facility: CLINIC | Age: 54
End: 2024-07-05
Payer: MEDICARE

## 2024-07-05 VITALS — BODY MASS INDEX: 21.53 KG/M2 | HEIGHT: 62 IN | WEIGHT: 117 LBS

## 2024-07-05 DIAGNOSIS — M25.572 LEFT ANKLE PAIN, UNSPECIFIED CHRONICITY: Primary | ICD-10-CM

## 2024-07-05 DIAGNOSIS — M19.072 OSTEOARTHRITIS OF LEFT ANKLE OR FOOT: ICD-10-CM

## 2024-07-05 DIAGNOSIS — S93.402A MODERATE LEFT ANKLE SPRAIN, INITIAL ENCOUNTER: ICD-10-CM

## 2024-07-05 PROBLEM — M19.079 OSTEOARTHRITIS OF ANKLE OR FOOT: Status: ACTIVE | Noted: 2024-07-05

## 2024-07-05 PROCEDURE — 99213 OFFICE O/P EST LOW 20 MIN: CPT | Performed by: PHYSICIAN ASSISTANT

## 2024-07-05 PROCEDURE — 99203 OFFICE O/P NEW LOW 30 MIN: CPT | Performed by: PHYSICIAN ASSISTANT

## 2024-07-05 PROCEDURE — 4004F PT TOBACCO SCREEN RCVD TLK: CPT | Performed by: PHYSICIAN ASSISTANT

## 2024-07-05 ASSESSMENT — PAIN SCALES - GENERAL
PAINLEVEL: 1
PAINLEVEL_OUTOF10: 1

## 2024-07-05 ASSESSMENT — LIFESTYLE VARIABLES
HOW OFTEN DURING THE LAST YEAR HAVE YOU HAD A FEELING OF GUILT OR REMORSE AFTER DRINKING: NEVER
HOW MANY STANDARD DRINKS CONTAINING ALCOHOL DO YOU HAVE ON A TYPICAL DAY: PATIENT DOES NOT DRINK
HOW OFTEN DURING THE LAST YEAR HAVE YOU FAILED TO DO WHAT WAS NORMALLY EXPECTED FROM YOU BECAUSE OF DRINKING: NEVER
HAS A RELATIVE, FRIEND, DOCTOR, OR ANOTHER HEALTH PROFESSIONAL EXPRESSED CONCERN ABOUT YOUR DRINKING OR SUGGESTED YOU CUT DOWN: NO
AUDIT TOTAL SCORE: 0
AUDIT-C TOTAL SCORE: 0
HOW OFTEN DO YOU HAVE A DRINK CONTAINING ALCOHOL: NEVER
HOW OFTEN DURING THE LAST YEAR HAVE YOU FOUND THAT YOU WERE NOT ABLE TO STOP DRINKING ONCE YOU HAD STARTED: NEVER
HAVE YOU OR SOMEONE ELSE BEEN INJURED AS A RESULT OF YOUR DRINKING: NO
SKIP TO QUESTIONS 9-10: 1
HOW OFTEN DURING THE LAST YEAR HAVE YOU BEEN UNABLE TO REMEMBER WHAT HAPPENED THE NIGHT BEFORE BECAUSE YOU HAD BEEN DRINKING: NEVER
HOW OFTEN DO YOU HAVE SIX OR MORE DRINKS ON ONE OCCASION: NEVER
HOW OFTEN DURING THE LAST YEAR HAVE YOU NEEDED AN ALCOHOLIC DRINK FIRST THING IN THE MORNING TO GET YOURSELF GOING AFTER A NIGHT OF HEAVY DRINKING: NEVER

## 2024-07-05 ASSESSMENT — PAIN - FUNCTIONAL ASSESSMENT: PAIN_FUNCTIONAL_ASSESSMENT: 0-10

## 2024-07-05 ASSESSMENT — ENCOUNTER SYMPTOMS
OCCASIONAL FEELINGS OF UNSTEADINESS: 0
DEPRESSION: 0
LOSS OF SENSATION IN FEET: 0

## 2024-07-05 NOTE — PROGRESS NOTES
Subjective      Chief Complaint   Patient presents with    Left Ankle - Pain          HPI  This 53-year-old woman presents today with left ankle pain (1/10).  She denies any recent trauma or injury.  She states 3 weeks ago she was sleeping when she twisted her left ankle and noticed immediate pain.  she states that over the past several weeks her left ankle pain has improved.  It is only worse with and aggravated by prolonged walking.  She does state that she history of remote tibial shaft fracture.  She denies any left lower extremity pain on exam today.    CARDIOLOGY:   Negative for chest pain, shortness of breath.   RESPIRATORY:   Negative for chest pain, shortness of breath.   MUSCULOSKELETAL:   See HPI for details.   NEUROLOGY:   Negative for tingling, numbness, weakness.    Objective    There were no vitals filed for this visit.    Physical Exam  GENERAL:          General Appearance:  This is a pleasant patient with appropriate affect, in no acute distress.   DERMATOLOGY:          Skin: skin at the neck, upper and lower back, and trunk is intact. There is no evidence of skin rash, skin breakdown or ulceration, or atrophic skin change.   EXTREMITIES:          Vascular:  Right, left hands and feet are warm with good color and pulses. Right and left calf and thigh are nontender and nonswollen.   NEUROLOGICAL:          Orientation:  Patient is alert and oriented to person, place, time and situation. Right and left upper and lower extremity motor and sensory examinations are intact.      MUSCULOSKELETAL: Neck: Nontender. No pain with range of motion.  Right ankle: Nontender no pain or limitation with range of motion.  Left ankle: There is no tenderness medially or laterally.  There is no pain with gentle range of motion flexion extension inversion and eversion.  Feldman's is negative and equal bilaterally.  Nontender in the left calf.  Neurovascular is intact.  The patient is seen today walking with a stable  gait.    XR ankle left 2 views    Result Date: 6/28/2024  Interpreted By:  Doc Mckeon, STUDY: XR ANKLE LEFT 2 VIEWS; ;  6/27/2024 3:15 pm   INDICATION: Signs/Symptoms:PAIN in LEFT ANKLE old injury no new injury PAIN at lateral malleolus.   COMPARISON: None.   ACCESSION NUMBER(S): [id number]   ORDERING CLINICIAN: CAMILO MURDOCK   FINDINGS: Left ankle, three views   Remote healed fracture through the distal tibia and fibula. No acute fracture dislocation seen. No malalignment. Sclerosis in the medial talar dome may reflect a osteochondral defect versus early degenerative change       Sclerosis in the medial talar dome may reflect a remote osteochondral defect versus early degenerative changes. Remote fracture deformities.   MACRO: None   Signed by: Doc Mckeon 6/28/2024 7:52 PM Dictation workstation:   [id number]       Evette was seen today for pain.  Diagnoses and all orders for this visit:  Left ankle pain, unspecified chronicity (Primary)  -     Referral to Orthopaedic Surgery  Moderate left ankle sprain, initial encounter  Osteoarthritis of left ankle or foot  Options are discussed with the patient in detail. The patient is given at home exercises to improve her left ankle strength. the patient is instructed regarding activity modification and risk for further injury with falling or trauma, ice, provider directed at home gentle strengthening and ROM exercises, and the appropriate use of Tylenol as needed for pain with its potential adverse reactions and side effects.  Return as needed the patient understands. Please note that this report has been produced using speech recognition software.  It may contain errors related to grammar, punctuation or spelling.  Electronically signed, but not reviewed.    Concepción Ho PA-C

## 2024-07-05 NOTE — PATIENT INSTRUCTIONS
Thank you for coming to see us today!     Continue to use tylenol for pain control.   Rest, ice and elevate and remember to do exercises.   Wear supportive shoes    Follow up  as needed

## 2024-07-08 NOTE — PROGRESS NOTES
This is a 53 year old female for FU and MEDICATION REVIEW for TRAZODONE now sleeping faster but only four hours nightly and on 100 mg since June 2024 and has VISIT with SLEEP MEDICINE    Has been sober from ETOH since 2017    Lab Results   Component Value Date    TSH 1.15 05/14/2024           ROS is NEG for HEADACHE, NAUSEA, VOMITING, DIARRHEA, CHEST PAIN, SOB, and BLEEDING and as further REVIEWED BELOW.    Subjective   Evette Abdul is a 53 y.o. female who presents for No chief complaint on file..    HPI:    Per nursing intake, pt here for No chief complaint on file.       Review of systems is essentially negative for all systems except for any identified issues in HPI above.    Objective     There were no vitals taken for this visit.     Physical Examination:       GENERAL           General Appearance: well-appearing, well-developed, well-hydrated, well-nourished, no acute distress.        HEENT           NECK supple, no masses or thyromegaly, no carotid bruit.        EYES           Extraocular Movements: normal, bilateral eyes RENNY, no conjunctival injection.        HEART           Rate and Rhythm regular rate and rhythm. Heart sounds: normal S1S2, no S3 or S4. Murmurs: none.        CHEST           Breath sounds: Clear to IPPA, RR<16 no use of accessory muscles.        ABDOMEN           General: Neg for LKKS or masses, no scleral icterus or jaundice.        MUSCULOSKELETAL           Joints Demonstration: Neg for erythema, swelling or joint deformities. gross abnormalities no gross abnormalities.        EXTREMITIES           Lower Extremities: Neg for cyanosis, clubbing or edema.       Assessment/Plan   Problem List Items Addressed This Visit       Alcoholic cirrhosis (Multi)    Hypothyroidism     Other Visit Diagnoses       Insomnia, unspecified type    -  Primary    History of alcohol abuse                FOLLOW UP:  PRN and as specified above         Paola Avilez M.D.

## 2024-07-12 ENCOUNTER — OFFICE VISIT (OUTPATIENT)
Dept: PRIMARY CARE | Facility: CLINIC | Age: 54
End: 2024-07-12
Payer: MEDICARE

## 2024-07-12 VITALS
HEART RATE: 75 BPM | TEMPERATURE: 97.8 F | DIASTOLIC BLOOD PRESSURE: 72 MMHG | SYSTOLIC BLOOD PRESSURE: 110 MMHG | WEIGHT: 118 LBS | BODY MASS INDEX: 21.58 KG/M2 | OXYGEN SATURATION: 99 %

## 2024-07-12 DIAGNOSIS — E03.9 HYPOTHYROIDISM, UNSPECIFIED TYPE: ICD-10-CM

## 2024-07-12 DIAGNOSIS — G47.00 INSOMNIA, UNSPECIFIED TYPE: Primary | ICD-10-CM

## 2024-07-12 DIAGNOSIS — F10.11 HISTORY OF ALCOHOL ABUSE: ICD-10-CM

## 2024-07-12 DIAGNOSIS — K70.30 ALCOHOLIC CIRRHOSIS, UNSPECIFIED WHETHER ASCITES PRESENT (MULTI): ICD-10-CM

## 2024-07-12 PROCEDURE — 3074F SYST BP LT 130 MM HG: CPT | Performed by: FAMILY MEDICINE

## 2024-07-12 PROCEDURE — 3078F DIAST BP <80 MM HG: CPT | Performed by: FAMILY MEDICINE

## 2024-07-12 PROCEDURE — 4004F PT TOBACCO SCREEN RCVD TLK: CPT | Performed by: FAMILY MEDICINE

## 2024-07-12 PROCEDURE — 99214 OFFICE O/P EST MOD 30 MIN: CPT | Performed by: FAMILY MEDICINE

## 2024-07-12 RX ORDER — TRAZODONE HYDROCHLORIDE 150 MG/1
150 TABLET ORAL NIGHTLY PRN
Qty: 30 TABLET | Refills: 0 | Status: SHIPPED | OUTPATIENT
Start: 2024-07-12 | End: 2025-07-12

## 2024-07-12 RX ORDER — TRAZODONE HYDROCHLORIDE 100 MG/1
100 TABLET ORAL NIGHTLY PRN
Qty: 90 TABLET | Refills: 3 | Status: SHIPPED | OUTPATIENT
Start: 2024-07-12 | End: 2024-07-12 | Stop reason: WASHOUT

## 2024-07-12 ASSESSMENT — COLUMBIA-SUICIDE SEVERITY RATING SCALE - C-SSRS
6. HAVE YOU EVER DONE ANYTHING, STARTED TO DO ANYTHING, OR PREPARED TO DO ANYTHING TO END YOUR LIFE?: NO
2. HAVE YOU ACTUALLY HAD ANY THOUGHTS OF KILLING YOURSELF?: NO
1. IN THE PAST MONTH, HAVE YOU WISHED YOU WERE DEAD OR WISHED YOU COULD GO TO SLEEP AND NOT WAKE UP?: NO

## 2024-07-12 ASSESSMENT — ENCOUNTER SYMPTOMS
DEPRESSION: 0
OCCASIONAL FEELINGS OF UNSTEADINESS: 0
LOSS OF SENSATION IN FEET: 0

## 2024-07-12 ASSESSMENT — PATIENT HEALTH QUESTIONNAIRE - PHQ9
SUM OF ALL RESPONSES TO PHQ9 QUESTIONS 1 AND 2: 0
1. LITTLE INTEREST OR PLEASURE IN DOING THINGS: NOT AT ALL
2. FEELING DOWN, DEPRESSED OR HOPELESS: NOT AT ALL

## 2024-07-12 ASSESSMENT — PAIN SCALES - GENERAL: PAINLEVEL: 0-NO PAIN

## 2024-07-25 ENCOUNTER — LAB (OUTPATIENT)
Dept: LAB | Facility: LAB | Age: 54
End: 2024-07-25
Payer: MEDICARE

## 2024-07-25 DIAGNOSIS — R94.5 ABNORMAL RESULTS OF LIVER FUNCTION STUDIES: Primary | ICD-10-CM

## 2024-07-25 LAB
ANION GAP SERPL CALC-SCNC: 13 MMOL/L
BUN SERPL-MCNC: 21 MG/DL (ref 8–25)
CALCIUM SERPL-MCNC: 9.8 MG/DL (ref 8.5–10.4)
CHLORIDE SERPL-SCNC: 99 MMOL/L (ref 97–107)
CO2 SERPL-SCNC: 25 MMOL/L (ref 24–31)
CREAT SERPL-MCNC: 0.8 MG/DL (ref 0.4–1.6)
EGFRCR SERPLBLD CKD-EPI 2021: 88 ML/MIN/1.73M*2
GLUCOSE SERPL-MCNC: 129 MG/DL (ref 65–99)
POTASSIUM SERPL-SCNC: 4 MMOL/L (ref 3.4–5.1)
SODIUM SERPL-SCNC: 137 MMOL/L (ref 133–145)

## 2024-07-25 PROCEDURE — 80048 BASIC METABOLIC PNL TOTAL CA: CPT

## 2024-07-25 PROCEDURE — 36415 COLL VENOUS BLD VENIPUNCTURE: CPT

## 2024-07-29 ENCOUNTER — APPOINTMENT (OUTPATIENT)
Dept: SLEEP MEDICINE | Facility: CLINIC | Age: 54
End: 2024-07-29
Payer: MEDICARE

## 2024-07-29 VITALS
HEIGHT: 62 IN | DIASTOLIC BLOOD PRESSURE: 66 MMHG | WEIGHT: 120 LBS | BODY MASS INDEX: 22.08 KG/M2 | HEART RATE: 79 BPM | OXYGEN SATURATION: 98 % | SYSTOLIC BLOOD PRESSURE: 108 MMHG

## 2024-07-29 DIAGNOSIS — G47.00 PERSISTENT DISORDER OF INITIATING OR MAINTAINING SLEEP: ICD-10-CM

## 2024-07-29 DIAGNOSIS — I10 ESSENTIAL HYPERTENSION: ICD-10-CM

## 2024-07-29 DIAGNOSIS — G47.00 INSOMNIA, UNSPECIFIED TYPE: ICD-10-CM

## 2024-07-29 DIAGNOSIS — G47.30 SLEEP-RELATED BREATHING DISORDER: Primary | ICD-10-CM

## 2024-07-29 DIAGNOSIS — F17.200 TOBACCO USE DISORDER: ICD-10-CM

## 2024-07-29 PROCEDURE — G2211 COMPLEX E/M VISIT ADD ON: HCPCS | Performed by: PHYSICIAN ASSISTANT

## 2024-07-29 PROCEDURE — 3074F SYST BP LT 130 MM HG: CPT | Performed by: PHYSICIAN ASSISTANT

## 2024-07-29 PROCEDURE — 3078F DIAST BP <80 MM HG: CPT | Performed by: PHYSICIAN ASSISTANT

## 2024-07-29 PROCEDURE — 3008F BODY MASS INDEX DOCD: CPT | Performed by: PHYSICIAN ASSISTANT

## 2024-07-29 PROCEDURE — 99204 OFFICE O/P NEW MOD 45 MIN: CPT | Performed by: PHYSICIAN ASSISTANT

## 2024-07-29 PROCEDURE — 4004F PT TOBACCO SCREEN RCVD TLK: CPT | Performed by: PHYSICIAN ASSISTANT

## 2024-07-29 ASSESSMENT — PAIN SCALES - GENERAL: PAINLEVEL: 0-NO PAIN

## 2024-07-29 NOTE — PROGRESS NOTES
[facility]  New Visit Note        HISTORY OF PRESENT ILLNESS     The patient's referring provider is: Paola Kwok, *    HISTORY OF PRESENT ILLNESS   Evette Abdul is a 53 y.o. female who presents to a [facility] for a sleep medicine evaluation with concerns of No chief complaint on file..     PAST SLEEP HISTORY    Patient has the following sleep-related diagnoses and sleep study results: none    CURRENT HISTORY    On today's visit, the patient reports she has difficulty with falling asleep and staying asleep.  She is currently taking trazodone 150 mg after she wakes up around 3 AM in the morning.  She is able to fall back to sleep after 30 minutes.  She then wakes up at 7 AM and does not feel tired at this time.    She previously used it around 10 PM before she tried falling asleep for the first time.  She did not find she was able to sleep throughout the night.  She denies side effects with trazodone at this time.    She has tried melatonin in the past.  Did not find helpful when she took it just before bedtime.  Was recommended she take it about an hour before bedtime but she has avoided trying as she states trazodone is working well.    She feels she wakes up at night to go to the bathroom.  States she was referred to make sure her waking and sleep difficulties have not secondary to something else.    STOP  2 TP  BANG 1 A    Sleep schedule  on weekdays / work days:  Usual Bedtime  10 p  Falls asleep around  ~10 p  Wake time  wakes around 3 am then goes back to sleep after 30-60 mintues auntil 7 am    Sleep schedule  on weekends/non work days :  same    Naps:   rarely    Average sleep duration 7.5 hours/day    Preferred sleeping position: side    Sleep-related ROS:    Sleep Initiation: takes 30-60 minutes to fall asleep    Sleep Maintenance: wakes to goto bathroom then takes trazodone and falls back to sleep in ~30 minutes    Recreational  "drug use  Smoking: trying to quit, current 1/4 PPD  Alcohol consumption: past use  Caffeine consumption:  never  Marijuana: past use    ESS: 2      PHYSICAL EXAM     VITAL SIGNS: There were no vitals taken for this visit.     PREVIOUS WEIGHTS:  Wt Readings from Last 3 Encounters:   07/12/24 53.5 kg (118 lb)   07/05/24 53.1 kg (117 lb)   06/27/24 53.1 kg (117 lb)       PHYSICAL EXAM: GENERAL: alert oriented x 3 pleasant and cooperative no acute distress  MODIFIED MALLAMPATI SCORE: 3+  LATERAL PHARYNGEAL WALL: 2+  NECK EXAM: normal supple no adenopathy    RESULTS/DATA     No results found for: \"IRON\", \"TRANSFERRIN\", \"IRONSAT\", \"TIBC\", \"FERRITIN\"    ASSESSMENT/PLAN     Ms. Abdul is a 53 y.o. female and was referred to the [facility] for the following issues:    OBSTRUCTIVE SLEEP APNEA / SLEEPINESS/ FREQUENT WAKING  -Ordering sleep study to evaluate    CHRONIC INSOMNIA  -Reviewed basic sleep hygiene  -Encouraged limiting naps during day, shorter and earlier in the day are preferable  -She feels with trazodone at 3 am she is doing very well  -Consider trying melatonin 1 mg at 9 pm  -SS to ruleout DUKE as contributing cause of insomnia    HYPERTENSION  BP Readings from Last 3 Encounters:   07/29/24 108/66   07/12/24 110/72   06/27/24 122/82      -Well controlled with current medications    TOBACCO DEPENDENCE  -Encourage cessation    Followup 3 weeks after sleep study to review results        "

## 2024-07-29 NOTE — PATIENT INSTRUCTIONS
Thank you for coming to the Sleep Medicine Clinic today! Your sleep medicine provider today was: Alessandro Steinberg PA-C Below is a summary of your treatment plan, other important information, and our contact numbers:      TREATMENT PLAN     Call [phone]), option 3 to schedule your sleep study. When you have an appointment please call us back at [phone] to schedule a followup appointment 3-4 weeks after to review results.    Obstructive Sleep Apnea (DUKE) is a sleep disorder where your upper airway muscles relax during sleep and the airway intermittently and repetitively narrows and collapses leading to partially blocked airway (hypopnea) or completely blocked airway (apnea) which, in turn, can disrupt breathing in sleep, lower oxygen levels while you sleep and cause night time wakings. Because both apnea and hypopnea may cause higher carbon dioxide or low oxygen levels, untreated DUKE can lead to heart arrhythmia, elevation of blood pressure, and make it harder for the body to consolidate memory and facilitate metabolism (leading to higher blood sugars at night). Frequent partial arousals occur during sleep resulting in sleep deprivation and daytime sleepiness. DUKE is associated with an increased risk of cardiovascular disease, stroke, hypertension, and insulin resistance. Moreover, untreated DUKE with excessive daytime sleepiness can increase the risk of motor vehicular accidents.    Some conservative strategies for DUKE regardless of DUKE severity are:   Positional therapy - Avoid sleeping on your back.   Healthy diet and regular exercise to optimize weight is highly encouraged.   Avoid alcohol late in the evening and sedative-hypnotics as these substances can make sleep apnea worse.   Improve breathing through the nose with intranasal steroid spray, saline rinse, or antihistamines    Safety: Avoid driving vehicle and operating heavy equipment while sleepy. Drowsy driving may lead to life-threatening  motor vehicle accidents. A person driving while sleepy is 5 times more likely to have an accident. If you feel sleepy, pull over and take a short power nap (sleep for less than 30 minutes). Otherwise, ask somebody to drive you.    Treatment options for sleep apnea include weight management, positional therapy, Positive Airway Therapy (PAP) therapy, oral appliance therapy, hypoglossal nerve stimulator (Inspire) and select airway surgeries.      OUR SLEEP TESTING LOCATIONS     Our team will contact you to schedule your sleep study, however, you can contact us as follow:  Main Phone Line (scheduling only): [phone]), option 3  Adult and Pediatric Locations  [facility] (6 years and older): [facility] - 4th floor ([address] [facility]) After hours line: [phone]  [facility] (Main campus: All ages): [facility] [place], 6th floor. After hours line: [phone]   [place] (18 years and older): [address] [facility], 2nd floor   [place] (18 years and older): [address] [facility]; 4th floor  After hours line: [phone]  [facility] (18 years and older) at [place]: [address] [facility]  After hours line: [phone]    [place] (5 years and older; younger considered on case-by-case basis): [address] [facility]; [address], [address]. Scheduling  After hours line: [phone]   [place] (6 years and older): [address]; [address]   [place] (6 years and older): [address] [facility], [address]  After hours line: [phone]   [place] (13 years and older) in [place]: [address], 2nd floor  After hours line: [phone]   [place] (13 year and older): [address]  After hours line: [phone]      IMPORTANT PHONE NUMBERS     Sleep Medicine Clinic Fax: [phone]  Appointments (for Adult Sleep Clinic): [phone] - option 2  Appointments (For Sleep Studies):  "[phone] - option 3  Behavioral Sleep Medicine: [phone]    [email or web address] ([place]): [phone]  For clinical questions and refilling prescriptions: [phone]  [place] (For Loan/[place]): P: [phone]  F: [phone]       CONTACTING YOUR SLEEP MEDICINE PROVIDER     Send a message directly to your provider through \"My Chart\", which is the email service through your  Records Account: [email or web address]   Call [phone] and leave a message. One of the administrative assistants will forward the message to your sleep medicine provider through \"My Chart\" and/or email.     Your sleep medicine provider for this visit was: Alessandro Steinberg PA-C  "

## 2024-08-02 ENCOUNTER — TELEPHONE (OUTPATIENT)
Dept: HEMATOLOGY/ONCOLOGY | Facility: CLINIC | Age: 54
End: 2024-08-02
Payer: MEDICARE

## 2024-08-02 DIAGNOSIS — C50.511 MALIGNANT NEOPLASM OF LOWER-OUTER QUADRANT OF RIGHT FEMALE BREAST, UNSPECIFIED ESTROGEN RECEPTOR STATUS (MULTI): ICD-10-CM

## 2024-08-02 RX ORDER — ANASTROZOLE 1 MG/1
1 TABLET ORAL DAILY
Qty: 90 TABLET | Refills: 3 | Status: SHIPPED | OUTPATIENT
Start: 2024-08-02

## 2024-08-02 NOTE — PROGRESS NOTES
"This is a 53 year old female for FU and MEDICATION REVIEW and on THYROID MED GERD Rx and STATIN and arimidex for INFILTRATING DUCTAL CARCINOMA breast in care of ONCOLOGY, lasix, and beta blocker for RAPID HR    PROLIA for OSTEOPENIA in care of ONCOLOGY      Uses trazodone for insomnia    She is concerned about vitamins due to taking OMEPRAZOLE and wants her Mg++    SHE IS SEEING HER GYNE Dr Tsang for UPDATE on PAP next week        ROS is NEG for HEADACHE, NAUSEA, VOMITING, DIARRHEA, CHEST PAIN, SOB, and BLEEDING and as further REVIEWED BELOW.    Subjective   Evette Abdul is a 53 y.o. female who presents for med review.    HPI:    Per nursing intake, pt here for med review       Review of systems is essentially negative for all systems except for any identified issues in HPI above.    Objective     /80   Pulse 81   Temp 36 °C (96.8 °F)   Ht 1.575 m (5' 2\")   Wt 53.5 kg (118 lb)   SpO2 100%   BMI 21.58 kg/m²      Physical Examination:       GENERAL           General Appearance: well-appearing, well-developed, well-hydrated, well-nourished, no acute distress.        HEENT           NECK supple, no masses or thyromegaly, no carotid bruit.        EYES           Extraocular Movements: normal, bilateral eyes RENNY, no conjunctival injection.        HEART           Rate and Rhythm regular rate and rhythm. Heart sounds: normal S1S2, no S3 or S4. Murmurs: none.        CHEST           Breath sounds: Clear to IPPA, RR<16 no use of accessory muscles.        ABDOMEN           General: Neg for LKKS or masses, no scleral icterus or jaundice.        MUSCULOSKELETAL           Joints Demonstration: Neg for erythema, swelling or joint deformities. gross abnormalities no gross abnormalities.        EXTREMITIES           Lower Extremities: Neg for cyanosis, clubbing or edema.       Assessment/Plan   Problem List Items Addressed This Visit       Alcoholic cirrhosis (Multi)    Montes's esophagus without dysplasia    " Hypothyroidism     Other Visit Diagnoses       Insomnia, unspecified type    -  Primary    Relevant Medications    traZODone (Desyrel) 150 mg tablet    Encounter for medication review        History of alcohol abuse        Combined hyperlipidemia                FOLLOW UP:  PRN and as specified above         Paola Avilez M.D.

## 2024-08-12 ENCOUNTER — OFFICE VISIT (OUTPATIENT)
Dept: PRIMARY CARE | Facility: CLINIC | Age: 54
End: 2024-08-12
Payer: MEDICARE

## 2024-08-12 VITALS
DIASTOLIC BLOOD PRESSURE: 80 MMHG | HEIGHT: 62 IN | HEART RATE: 81 BPM | OXYGEN SATURATION: 100 % | WEIGHT: 118 LBS | SYSTOLIC BLOOD PRESSURE: 110 MMHG | TEMPERATURE: 96.8 F | BODY MASS INDEX: 21.71 KG/M2

## 2024-08-12 DIAGNOSIS — F10.11 HISTORY OF ALCOHOL ABUSE: ICD-10-CM

## 2024-08-12 DIAGNOSIS — E03.9 HYPOTHYROIDISM, UNSPECIFIED TYPE: ICD-10-CM

## 2024-08-12 DIAGNOSIS — E78.2 COMBINED HYPERLIPIDEMIA: ICD-10-CM

## 2024-08-12 DIAGNOSIS — Z79.899 ENCOUNTER FOR MEDICATION REVIEW: ICD-10-CM

## 2024-08-12 DIAGNOSIS — K22.70 BARRETT'S ESOPHAGUS WITHOUT DYSPLASIA: ICD-10-CM

## 2024-08-12 DIAGNOSIS — K70.30 ALCOHOLIC CIRRHOSIS, UNSPECIFIED WHETHER ASCITES PRESENT (MULTI): ICD-10-CM

## 2024-08-12 DIAGNOSIS — E55.9 VITAMIN D DEFICIENCY: ICD-10-CM

## 2024-08-12 DIAGNOSIS — G47.00 INSOMNIA, UNSPECIFIED TYPE: Primary | ICD-10-CM

## 2024-08-12 PROCEDURE — 3079F DIAST BP 80-89 MM HG: CPT | Performed by: FAMILY MEDICINE

## 2024-08-12 PROCEDURE — 4004F PT TOBACCO SCREEN RCVD TLK: CPT | Performed by: FAMILY MEDICINE

## 2024-08-12 PROCEDURE — 3074F SYST BP LT 130 MM HG: CPT | Performed by: FAMILY MEDICINE

## 2024-08-12 PROCEDURE — 99214 OFFICE O/P EST MOD 30 MIN: CPT | Performed by: FAMILY MEDICINE

## 2024-08-12 PROCEDURE — 3008F BODY MASS INDEX DOCD: CPT | Performed by: FAMILY MEDICINE

## 2024-08-12 RX ORDER — TRAZODONE HYDROCHLORIDE 150 MG/1
150 TABLET ORAL NIGHTLY PRN
Qty: 30 TABLET | Refills: 0 | Status: SHIPPED | OUTPATIENT
Start: 2024-08-12 | End: 2025-08-12

## 2024-08-12 ASSESSMENT — PAIN SCALES - GENERAL: PAINLEVEL: 0-NO PAIN

## 2024-08-19 ENCOUNTER — TELEPHONE (OUTPATIENT)
Dept: PRIMARY CARE | Facility: CLINIC | Age: 54
End: 2024-08-19
Payer: MEDICARE

## 2024-08-19 NOTE — TELEPHONE ENCOUNTER
Left message for patient to call the office. Need to clarify if following Dr. Avilez to [facility] or if needs to establish care with a new provider.

## 2024-08-20 NOTE — TELEPHONE ENCOUNTER
PT returning call, stating that she is planning on following Dr. Avilez to [facility].  PT waiting to schedule at new office, as they are not scheduling for Dr. Avilez at this time.

## 2024-08-22 ENCOUNTER — LAB (OUTPATIENT)
Dept: LAB | Facility: LAB | Age: 54
End: 2024-08-22
Payer: MEDICARE

## 2024-08-22 DIAGNOSIS — K22.70 BARRETT'S ESOPHAGUS WITHOUT DYSPLASIA: ICD-10-CM

## 2024-08-22 DIAGNOSIS — E55.9 VITAMIN D DEFICIENCY: ICD-10-CM

## 2024-08-22 LAB
25(OH)D3 SERPL-MCNC: 90 NG/ML (ref 31–100)
MAGNESIUM SERPL-MCNC: 2.2 MG/DL (ref 1.6–3.1)
VIT B12 SERPL-MCNC: 729 PG/ML (ref 211–946)

## 2024-08-22 PROCEDURE — 82607 VITAMIN B-12: CPT

## 2024-08-22 PROCEDURE — 36415 COLL VENOUS BLD VENIPUNCTURE: CPT

## 2024-08-22 PROCEDURE — 83735 ASSAY OF MAGNESIUM: CPT

## 2024-08-22 PROCEDURE — 82306 VITAMIN D 25 HYDROXY: CPT

## 2024-09-20 DIAGNOSIS — G47.00 INSOMNIA, UNSPECIFIED TYPE: ICD-10-CM

## 2024-09-20 RX ORDER — TRAZODONE HYDROCHLORIDE 150 MG/1
150 TABLET ORAL NIGHTLY PRN
Qty: 30 TABLET | Refills: 0 | Status: CANCELLED | OUTPATIENT
Start: 2024-09-20 | End: 2025-09-20

## 2024-09-20 NOTE — TELEPHONE ENCOUNTER
Paul bailey requesting rx refill for avilez's pt. Last ov 8/12/24 please call pt to verify if she will be following Avilez or establishing with a provider here

## 2024-09-20 NOTE — TELEPHONE ENCOUNTER
[employer] message sent asking patient if following Dr. Avilez or est care with a new provider - awaiting a reply.

## 2024-10-04 ENCOUNTER — APPOINTMENT (OUTPATIENT)
Dept: RADIOLOGY | Facility: HOSPITAL | Age: 54
End: 2024-10-04
Payer: MEDICARE

## 2024-10-04 ENCOUNTER — TELEPHONE (OUTPATIENT)
Dept: HEMATOLOGY/ONCOLOGY | Facility: CLINIC | Age: 54
End: 2024-10-04
Payer: MEDICARE

## 2024-10-07 DIAGNOSIS — C50.511 MALIGNANT NEOPLASM OF LOWER-OUTER QUADRANT OF RIGHT FEMALE BREAST, UNSPECIFIED ESTROGEN RECEPTOR STATUS: Primary | ICD-10-CM

## 2024-10-07 RX ORDER — DIPHENHYDRAMINE HYDROCHLORIDE 50 MG/ML
50 INJECTION INTRAMUSCULAR; INTRAVENOUS AS NEEDED
OUTPATIENT
Start: 2024-10-07

## 2024-10-07 RX ORDER — FAMOTIDINE 10 MG/ML
20 INJECTION INTRAVENOUS ONCE AS NEEDED
OUTPATIENT
Start: 2024-10-07

## 2024-10-07 RX ORDER — HEPARIN 100 UNIT/ML
500 SYRINGE INTRAVENOUS AS NEEDED
OUTPATIENT
Start: 2024-10-07

## 2024-10-07 RX ORDER — EPINEPHRINE 0.3 MG/.3ML
0.3 INJECTION SUBCUTANEOUS EVERY 5 MIN PRN
OUTPATIENT
Start: 2024-10-07

## 2024-10-07 RX ORDER — ALBUTEROL SULFATE 0.83 MG/ML
3 SOLUTION RESPIRATORY (INHALATION) AS NEEDED
OUTPATIENT
Start: 2024-10-07

## 2024-10-07 RX ORDER — HEPARIN SODIUM,PORCINE/PF 10 UNIT/ML
50 SYRINGE (ML) INTRAVENOUS AS NEEDED
OUTPATIENT
Start: 2024-10-07

## 2024-10-09 ENCOUNTER — APPOINTMENT (OUTPATIENT)
Dept: HEMATOLOGY/ONCOLOGY | Facility: CLINIC | Age: 54
End: 2024-10-09
Payer: MEDICARE

## 2024-10-11 ENCOUNTER — APPOINTMENT (OUTPATIENT)
Dept: SLEEP MEDICINE | Facility: HOSPITAL | Age: 54
End: 2024-10-11
Payer: MEDICARE

## 2024-10-15 ENCOUNTER — APPOINTMENT (OUTPATIENT)
Dept: SURGERY | Facility: CLINIC | Age: 54
End: 2024-10-15
Payer: MEDICARE

## 2024-10-28 ENCOUNTER — HOSPITAL ENCOUNTER (OUTPATIENT)
Dept: RADIOLOGY | Facility: HOSPITAL | Age: 54
Discharge: HOME | End: 2024-10-28
Payer: MEDICARE

## 2024-10-28 DIAGNOSIS — K70.31 ALCOHOLIC CIRRHOSIS OF LIVER WITH ASCITES (MULTI): ICD-10-CM

## 2024-10-28 PROCEDURE — 76705 ECHO EXAM OF ABDOMEN: CPT | Performed by: RADIOLOGY

## 2024-10-28 PROCEDURE — 76705 ECHO EXAM OF ABDOMEN: CPT

## 2024-10-29 ENCOUNTER — HOSPITAL ENCOUNTER (OUTPATIENT)
Dept: RADIOLOGY | Facility: HOSPITAL | Age: 54
Discharge: HOME | End: 2024-10-29
Payer: MEDICARE

## 2024-10-29 ENCOUNTER — LAB (OUTPATIENT)
Dept: LAB | Facility: LAB | Age: 54
End: 2024-10-29
Payer: MEDICARE

## 2024-10-29 VITALS — HEIGHT: 62 IN | BODY MASS INDEX: 21.71 KG/M2 | WEIGHT: 118 LBS

## 2024-10-29 DIAGNOSIS — C50.511 MALIGNANT NEOPLASM OF LOWER-OUTER QUADRANT OF RIGHT BREAST OF FEMALE, ESTROGEN RECEPTOR POSITIVE: ICD-10-CM

## 2024-10-29 DIAGNOSIS — Z17.0 ESTROGEN RECEPTOR POSITIVE STATUS (ER+): ICD-10-CM

## 2024-10-29 DIAGNOSIS — C50.511 MALIGNANT NEOPLASM OF LOWER-OUTER QUADRANT OF RIGHT FEMALE BREAST: ICD-10-CM

## 2024-10-29 DIAGNOSIS — Z17.0 MALIGNANT NEOPLASM OF LOWER-OUTER QUADRANT OF RIGHT BREAST OF FEMALE, ESTROGEN RECEPTOR POSITIVE: ICD-10-CM

## 2024-10-29 DIAGNOSIS — C50.511 MALIGNANT NEOPLASM OF LOWER-OUTER QUADRANT OF RIGHT FEMALE BREAST, UNSPECIFIED ESTROGEN RECEPTOR STATUS: ICD-10-CM

## 2024-10-29 LAB
ALBUMIN SERPL BCP-MCNC: 4.6 G/DL (ref 3.4–5)
ALP SERPL-CCNC: 62 U/L (ref 33–110)
ALT SERPL W P-5'-P-CCNC: 17 U/L (ref 7–45)
ANION GAP SERPL CALCULATED.3IONS-SCNC: 12 MMOL/L (ref 10–20)
AST SERPL W P-5'-P-CCNC: 21 U/L (ref 9–39)
BILIRUB SERPL-MCNC: 0.7 MG/DL (ref 0–1.2)
BUN SERPL-MCNC: 18 MG/DL (ref 6–23)
CALCIUM SERPL-MCNC: 10.3 MG/DL (ref 8.6–10.3)
CHLORIDE SERPL-SCNC: 102 MMOL/L (ref 98–107)
CO2 SERPL-SCNC: 31 MMOL/L (ref 21–32)
CREAT SERPL-MCNC: 0.89 MG/DL (ref 0.5–1.05)
EGFRCR SERPLBLD CKD-EPI 2021: 77 ML/MIN/1.73M*2
GLUCOSE SERPL-MCNC: 97 MG/DL (ref 74–99)
POTASSIUM SERPL-SCNC: 3.8 MMOL/L (ref 3.5–5.3)
PROT SERPL-MCNC: 7.6 G/DL (ref 6.4–8.2)
SODIUM SERPL-SCNC: 141 MMOL/L (ref 136–145)

## 2024-10-29 PROCEDURE — 80053 COMPREHEN METABOLIC PANEL: CPT

## 2024-10-29 PROCEDURE — 77062 BREAST TOMOSYNTHESIS BI: CPT | Performed by: RADIOLOGY

## 2024-10-29 PROCEDURE — 82330 ASSAY OF CALCIUM: CPT

## 2024-10-29 PROCEDURE — 77062 BREAST TOMOSYNTHESIS BI: CPT

## 2024-10-29 PROCEDURE — 77066 DX MAMMO INCL CAD BI: CPT | Performed by: RADIOLOGY

## 2024-10-29 PROCEDURE — 36415 COLL VENOUS BLD VENIPUNCTURE: CPT

## 2024-10-30 LAB — CA-I BLD-SCNC: 1.24 MMOL/L (ref 1.1–1.33)

## 2024-11-05 ENCOUNTER — APPOINTMENT (OUTPATIENT)
Dept: SLEEP MEDICINE | Facility: CLINIC | Age: 54
End: 2024-11-05
Payer: MEDICARE
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
## 2024-11-06 ENCOUNTER — INFUSION (OUTPATIENT)
Dept: HEMATOLOGY/ONCOLOGY | Facility: CLINIC | Age: 54
End: 2024-11-06
Payer: MEDICARE

## 2024-11-06 ENCOUNTER — OFFICE VISIT (OUTPATIENT)
Dept: HEMATOLOGY/ONCOLOGY | Facility: CLINIC | Age: 54
End: 2024-11-06
Payer: MEDICARE

## 2024-11-06 VITALS
HEART RATE: 76 BPM | SYSTOLIC BLOOD PRESSURE: 110 MMHG | BODY MASS INDEX: 20.85 KG/M2 | TEMPERATURE: 96.2 F | DIASTOLIC BLOOD PRESSURE: 76 MMHG | WEIGHT: 113.98 LBS | RESPIRATION RATE: 17 BRPM | OXYGEN SATURATION: 98 %

## 2024-11-06 DIAGNOSIS — M85.80 OSTEOPENIA, UNSPECIFIED LOCATION: Primary | ICD-10-CM

## 2024-11-06 DIAGNOSIS — C50.911 INFILTRATING DUCTAL CARCINOMA OF RIGHT BREAST (MULTI): ICD-10-CM

## 2024-11-06 DIAGNOSIS — C50.511 MALIGNANT NEOPLASM OF LOWER-OUTER QUADRANT OF RIGHT FEMALE BREAST, UNSPECIFIED ESTROGEN RECEPTOR STATUS: ICD-10-CM

## 2024-11-06 DIAGNOSIS — Z78.0 MENOPAUSE: ICD-10-CM

## 2024-11-06 PROCEDURE — 3078F DIAST BP <80 MM HG: CPT

## 2024-11-06 PROCEDURE — 99213 OFFICE O/P EST LOW 20 MIN: CPT

## 2024-11-06 PROCEDURE — 3074F SYST BP LT 130 MM HG: CPT

## 2024-11-06 PROCEDURE — 2500000004 HC RX 250 GENERAL PHARMACY W/ HCPCS (ALT 636 FOR OP/ED): Mod: JG

## 2024-11-06 PROCEDURE — 96372 THER/PROPH/DIAG INJ SC/IM: CPT

## 2024-11-06 RX ORDER — FAMOTIDINE 10 MG/ML
20 INJECTION INTRAVENOUS ONCE AS NEEDED
Status: DISCONTINUED | OUTPATIENT
Start: 2024-11-06 | End: 2024-11-06 | Stop reason: HOSPADM

## 2024-11-06 RX ORDER — ALBUTEROL SULFATE 0.83 MG/ML
3 SOLUTION RESPIRATORY (INHALATION) AS NEEDED
OUTPATIENT
Start: 2025-04-05

## 2024-11-06 RX ORDER — DIPHENHYDRAMINE HYDROCHLORIDE 50 MG/ML
50 INJECTION INTRAMUSCULAR; INTRAVENOUS AS NEEDED
OUTPATIENT
Start: 2025-04-05

## 2024-11-06 RX ORDER — ALBUTEROL SULFATE 0.83 MG/ML
3 SOLUTION RESPIRATORY (INHALATION) AS NEEDED
Status: DISCONTINUED | OUTPATIENT
Start: 2024-11-06 | End: 2024-11-06 | Stop reason: HOSPADM

## 2024-11-06 RX ORDER — DIPHENHYDRAMINE HYDROCHLORIDE 50 MG/ML
50 INJECTION INTRAMUSCULAR; INTRAVENOUS AS NEEDED
Status: DISCONTINUED | OUTPATIENT
Start: 2024-11-06 | End: 2024-11-06 | Stop reason: HOSPADM

## 2024-11-06 RX ORDER — FAMOTIDINE 10 MG/ML
20 INJECTION INTRAVENOUS ONCE AS NEEDED
OUTPATIENT
Start: 2025-04-05

## 2024-11-06 RX ORDER — EPINEPHRINE 0.3 MG/.3ML
0.3 INJECTION SUBCUTANEOUS EVERY 5 MIN PRN
Status: DISCONTINUED | OUTPATIENT
Start: 2024-11-06 | End: 2024-11-06 | Stop reason: HOSPADM

## 2024-11-06 RX ORDER — EPINEPHRINE 0.3 MG/.3ML
0.3 INJECTION SUBCUTANEOUS EVERY 5 MIN PRN
OUTPATIENT
Start: 2025-04-05

## 2024-11-06 ASSESSMENT — PAIN SCALES - GENERAL: PAINLEVEL_OUTOF10: 0-NO PAIN

## 2024-11-06 NOTE — PROGRESS NOTES
Pt denies any recent/planned dental work, surgeries, infections, or antibiotic use. Pt states she has a routine dental cleaning in 2 weeks. Pt educated on the risk of jaw necrosis if she were to have invasive dental work done within 4 weeks of prolia - pt educated to tell her dentist that she's on prolia & to consult her [facility] doctor if she needs invasive dental work. Pt understanding of this education & uses teach back method.

## 2024-11-11 NOTE — PROGRESS NOTES
Subjective   Patient ID: Evette Abdul is a 54 y.o. female who presents for breast exam.  HPI  Patient was last seen in office on 10/12/2023 for breast examination, she had normal imaging and examination.  Patient last saw Nellie Diehl PA-C  Hem Onc on 11/6/2024 for normal exam  Patient continuing on anastrozole as directed.    STUDY:  BI MAMMO BILATERAL DIAGNOSTIC TOMOSYNTHESIS;  10/29/2024 2:03 pm      ACCESSION NUMBER(S):  [id number]      ORDERING CLINICIAN:  SEVERINO VEGA      INDICATION:  History of right breast carcinoma      ,C50.511 Malignant neoplasm of lower-outer quadrant of right female  breast,Z17.0 Estrogen receptor positive status (ER+)      COMPARISON:  10/03/2023, 10/03/2022, 05/02/2022, 10/01/2021      FINDINGS:  2D and tomosynthesis images were reviewed at 1 mm slice thickness.      Density:  There are scattered areas of fibroglandular density.      There is a minimally invasive biopsy clips seen within the left  breast. On the right, there is postoperative scarring seen within the  posterior depth of the right breast laterally. There are benign  calcifications seen within each breast. No suspicious mass or tumoral  calcifications are observed.      IMPRESSION:  No mammographic evidence of malignancy.      BI-RADS CATEGORY:  BI-RADS Category:  2 Benign.  Recommendation:  Annual Screening.  Recommended Date:  1 Year.  Laterality:  Bilateral.      For any future breast imaging appointments, please call [phone]).          MACRO:  None      Signed by: Alana Denny 10/29/2024 2:23 PM  Dictation workstation:   [id number]    Social History:  Tobacco Use -yes  Do you use any recreational drugs -no  Alcohol Use -no  Caffeine Intake - no  Working - no  Marital status - single  Living with -  significant  other  Past Medical History:   Diagnosis Date    Alcoholism (Multi)     Ascites     Montes's syndrome     Breast cancer (Multi) 10/2020    Right Breast IDC    Disease of thyroid gland  2014    GERD (gastroesophageal reflux disease) 2008    Hx antineoplastic chemo     Hypothyroidism     Insomnia 2023?    Liver disease 2014    Other specified health status     No pertinent past surgical history    Personal history of irradiation     Personal history of malignant neoplasm of cervix uteri     History of malignant neoplasm of cervix uteri    Personal history of other diseases of the circulatory system     History of hypertension    Personal history of transient ischemic attack (TIA), and cerebral infarction without residual deficits     History of stroke    Pleural effusion 2015?    Stroke (Multi) 2008      Past Surgical History:   Procedure Laterality Date    BI MAMMO GUIDED BREAST RIGHT LOCALIZATION Right 11/25/2020    BI MAMMO GUIDED LOCALIZATION BREAST RIGHT [facility] SURG AIB LEGACY    BI US GUIDED BREAST LOCALIZATION AND BIOPSY LEFT Left 08/29/2019    BI US GUIDED BREAST LOCALIZATION AND BIOPSY LEFT [facility] CLINICAL LEGACY    BI US GUIDED BREAST LOCALIZATION AND BIOPSY RIGHT Right 11/09/2020    BI US GUIDED BREAST LOCALIZATION AND BIOPSY RIGHT [facility]    BREAST LUMPECTOMY      LEG SURGERY      left leg fracture repair    ORTHODONTIC TREATMENT  1985?    US GUIDED ABDOMINAL PARACENTESIS  07/19/2014    US GUIDED ABDOMINAL PARACENTESIS [facility] CLINICAL LEGACY    US GUIDED ABDOMINAL PARACENTESIS  07/22/2014    US GUIDED ABDOMINAL PARACENTESIS [facility]ICAL LEGAC[facility]    US GUIDED ABDOMINAL PARACENTESIS  01/13/2015    US GUIDED ABDOMINAL PARACENTESIS [facility] CL[facility]ICAL LEGACY    US GUIDED ABDOMINAL PARACENTESIS  02/03/2015    US GUIDED ABDOMINAL PARACENTESIS [facility] CL[facility]ICAL LEGACY    US GUIDED ABDOMINAL PARACENTESIS  02/25/2015    US GUIDED ABDOMINAL PARACENTESIS [facility] CL[facility]ICAL LEGAC[facility]      Past Medical History:   Diagnosis Date    Alcoholism (Multi)     Ascites     Montes's syndrome     Breast cancer (Multi) 10/2020    Right Breast IDC    Disease of thyroid gland 2014    GERD (gastroesophageal reflux disease) 2008     "Hx antineoplastic chemo     Hypothyroidism     Insomnia 2023?    Liver disease 2014    Other specified health status     No pertinent past surgical history    Personal history of irradiation     Personal history of malignant neoplasm of cervix uteri     History of malignant neoplasm of cervix uteri    Personal history of other diseases of the circulatory system     History of hypertension    Personal history of transient ischemic attack (TIA), and cerebral infarction without residual deficits     History of stroke    Pleural effusion 2015?    Stroke (Multi) 2008      Allergies   Allergen Reactions    Acetaminophen Unknown    Aspirin Unknown    Codeine Unknown    Ibuprofen Unknown    Tramadol Unknown          Review of Systems  BP 85/62 (BP Location: Left arm, Patient Position: Sitting, BP Cuff Size: Adult)   Pulse 53   Temp 36.7 °C (98 °F) (Temporal)   Resp 17   Ht 1.575 m (5' 2\")   Wt 51.3 kg (113 lb)   SpO2 99%   BMI 20.67 kg/m²    Objective   Physical Exam  GENERAL APPEARANCE: Patient appears in no acute distress. very petite  EYES: Sclera non-icteric, PERRLA.   ENT Normal appearance of ears and nose.   NECK/THYROID: Neck: no masses. Thyroid: no masses.   LYMPH NODES: No cervical or supraclavicular lymphadenopathy.   CARDIOVASCULAR Heart: RRR, no murmurs; Carotid bruits: none; Peripheral edema: none.   RESPIRATORY: Lungs: Bilateral inspiratory and expiratory wheezing; no respiratory distress.   BREASTS: Exam done both in upright and supine position. On inspection no skin dimpling, no peau d'orange, right breast lumpectomy scar healed nicely; Masses: none palpable in either breast.  Very little breast tissue.  Axillary lymphadenopathy: none.   GI (ABDOMEN) No intraabdominal mass appreciated, no hepatosplenomegaly; Hernia: none; Tenderness: none.   PSYCH: Patient oriented to time, place and person, normal affect.    Assessment/Plan   Problem List Items Addressed This Visit             ICD-10-CM       " Hematology and Neoplasia    Infiltrating ductal carcinoma of right breast (Multi) - Primary C50.911     Normal exam and imaging. Repeat exam and imaging in one year             BREAST HISTORY:      Patient has a history of a previous left breast core biopsy.  The patient is s/p a screening mammogram with pierce on 10/24/20 that in the right breast showed a possible developing NEW asymmetry right breast at 9 o'clock posterior depth.  Diagnostic of the right revealed a roughly 5 mm irregulary marginated focal asymmetric density betwwen 8- and 9 o'clock at the junction of the mid and posterior depths.  Ultrasound identified a solid, taller than wide, 4 x 4 x 76 mm hypoechoic mass with posterior shadowing at 8 o'clock, 5 cm DFN.    She underwent a US-guided biopsy right breast at the 8 o'clock position that revealed invasive ductal carcinoma, with multiple core biopsy pieces measuring 0.4 cm in greatest dimension, grade 1 of 3, ER/MA postive, Sky2ffk negative.  There was concordance.   The patient is s/p right breast wire localization lumpectomy with a sentinel node biopsy on 11/25/20.  Two sentinel nodes were excised. Three lymph nodes were negative for metastasis.  Right breast tissue revealed breast parenchyma with no residual malignancy identified. Margins were clean.  She underwent radiation therapy with Dr. Flynn from 2/5/21 - 2/26/21.  She was initiated on to anastrozole mid March, then Prolia as well as of 3/25/21.    Previous breast biopsy: yes, left breast, Menarche: 11, Age of first delivery: Nulliparous, Breastfeed: No, Menopause: 45, HRT: No, Family history of breast cancer: No, Family history of ovarian cancer: No, Family history of pancreatic cancer: No.         ELIZABETH ESCOBAR MA 11/12/24 2:47 PM

## 2024-11-12 ENCOUNTER — OFFICE VISIT (OUTPATIENT)
Dept: SURGERY | Facility: CLINIC | Age: 54
End: 2024-11-12
Payer: MEDICARE

## 2024-11-12 VITALS
WEIGHT: 113 LBS | OXYGEN SATURATION: 99 % | HEIGHT: 62 IN | TEMPERATURE: 98 F | DIASTOLIC BLOOD PRESSURE: 62 MMHG | RESPIRATION RATE: 17 BRPM | SYSTOLIC BLOOD PRESSURE: 85 MMHG | BODY MASS INDEX: 20.8 KG/M2 | HEART RATE: 53 BPM

## 2024-11-12 DIAGNOSIS — C50.511 MALIGNANT NEOPLASM OF LOWER-OUTER QUADRANT OF RIGHT FEMALE BREAST: ICD-10-CM

## 2024-11-12 DIAGNOSIS — C50.911 INFILTRATING DUCTAL CARCINOMA OF RIGHT BREAST (MULTI): Primary | ICD-10-CM

## 2024-11-12 PROCEDURE — 3078F DIAST BP <80 MM HG: CPT | Performed by: SURGERY

## 2024-11-12 PROCEDURE — 3008F BODY MASS INDEX DOCD: CPT | Performed by: SURGERY

## 2024-11-12 PROCEDURE — 4004F PT TOBACCO SCREEN RCVD TLK: CPT | Performed by: SURGERY

## 2024-11-12 PROCEDURE — 99214 OFFICE O/P EST MOD 30 MIN: CPT | Performed by: SURGERY

## 2024-11-12 PROCEDURE — 3074F SYST BP LT 130 MM HG: CPT | Performed by: SURGERY

## 2024-11-12 ASSESSMENT — PATIENT HEALTH QUESTIONNAIRE - PHQ9
2. FEELING DOWN, DEPRESSED OR HOPELESS: NOT AT ALL
1. LITTLE INTEREST OR PLEASURE IN DOING THINGS: NOT AT ALL
SUM OF ALL RESPONSES TO PHQ9 QUESTIONS 1 AND 2: 0

## 2024-11-12 ASSESSMENT — PAIN SCALES - GENERAL: PAINLEVEL_OUTOF10: 0-NO PAIN

## 2024-11-13 ENCOUNTER — LAB (OUTPATIENT)
Dept: LAB | Facility: LAB | Age: 54
End: 2024-11-13
Payer: MEDICARE

## 2024-11-13 DIAGNOSIS — K70.31 ALCOHOLIC CIRRHOSIS OF LIVER WITH ASCITES (MULTI): Primary | ICD-10-CM

## 2024-11-13 LAB
ALBUMIN SERPL BCP-MCNC: 4.2 G/DL (ref 3.4–5)
ALP SERPL-CCNC: 59 U/L (ref 33–110)
ALT SERPL W P-5'-P-CCNC: 14 U/L (ref 7–45)
AST SERPL W P-5'-P-CCNC: 19 U/L (ref 9–39)
BILIRUB DIRECT SERPL-MCNC: 0.1 MG/DL (ref 0–0.3)
BILIRUB SERPL-MCNC: 0.8 MG/DL (ref 0–1.2)
PROT SERPL-MCNC: 7 G/DL (ref 6.4–8.2)

## 2024-11-13 PROCEDURE — 82105 ALPHA-FETOPROTEIN SERUM: CPT

## 2024-11-13 PROCEDURE — 80076 HEPATIC FUNCTION PANEL: CPT

## 2024-11-13 PROCEDURE — 36415 COLL VENOUS BLD VENIPUNCTURE: CPT

## 2024-11-14 LAB — AFP SERPL-MCNC: <4 NG/ML (ref 0–9)

## 2025-03-26 ENCOUNTER — HOSPITAL ENCOUNTER (OUTPATIENT)
Dept: RADIOLOGY | Facility: HOSPITAL | Age: 55
Discharge: HOME | End: 2025-03-26
Payer: MEDICARE

## 2025-03-26 DIAGNOSIS — K70.31 ALCOHOLIC CIRRHOSIS OF LIVER WITH ASCITES (MULTI): ICD-10-CM

## 2025-03-26 PROCEDURE — 76705 ECHO EXAM OF ABDOMEN: CPT

## 2025-04-09 PROCEDURE — 87624 HPV HI-RISK TYP POOLED RSLT: CPT

## 2025-04-09 PROCEDURE — 88175 CYTOPATH C/V AUTO FLUID REDO: CPT

## 2025-04-11 ENCOUNTER — LAB REQUISITION (OUTPATIENT)
Dept: LAB | Facility: HOSPITAL | Age: 55
End: 2025-04-11
Payer: MEDICARE

## 2025-04-11 DIAGNOSIS — Z11.51 ENCOUNTER FOR SCREENING FOR HUMAN PAPILLOMAVIRUS (HPV): ICD-10-CM

## 2025-04-11 DIAGNOSIS — Z01.419 ENCOUNTER FOR GYNECOLOGICAL EXAMINATION (GENERAL) (ROUTINE) WITHOUT ABNORMAL FINDINGS: ICD-10-CM

## 2025-05-06 ENCOUNTER — HOSPITAL ENCOUNTER (OUTPATIENT)
Dept: RADIOLOGY | Facility: HOSPITAL | Age: 55
Discharge: HOME | End: 2025-05-06
Payer: MEDICARE

## 2025-05-06 ENCOUNTER — APPOINTMENT (OUTPATIENT)
Dept: LAB | Facility: HOSPITAL | Age: 55
End: 2025-05-06
Payer: MEDICARE

## 2025-05-06 ENCOUNTER — TELEPHONE (OUTPATIENT)
Dept: HEMATOLOGY/ONCOLOGY | Facility: CLINIC | Age: 55
End: 2025-05-06

## 2025-05-06 DIAGNOSIS — C50.511 MALIGNANT NEOPLASM OF LOWER-OUTER QUADRANT OF RIGHT FEMALE BREAST, UNSPECIFIED ESTROGEN RECEPTOR STATUS: ICD-10-CM

## 2025-05-06 DIAGNOSIS — C50.511 MALIGNANT NEOPLASM OF LOWER-OUTER QUADRANT OF RIGHT FEMALE BREAST: Primary | ICD-10-CM

## 2025-05-06 DIAGNOSIS — M85.80 OSTEOPENIA, UNSPECIFIED LOCATION: ICD-10-CM

## 2025-05-06 DIAGNOSIS — C50.911 INFILTRATING DUCTAL CARCINOMA OF RIGHT BREAST: ICD-10-CM

## 2025-05-06 DIAGNOSIS — Z78.0 MENOPAUSE: ICD-10-CM

## 2025-05-06 LAB
ALBUMIN SERPL BCP-MCNC: 4.3 G/DL (ref 3.4–5)
ALP SERPL-CCNC: 44 U/L (ref 33–110)
ALT SERPL W P-5'-P-CCNC: 16 U/L (ref 7–45)
ANION GAP SERPL CALCULATED.3IONS-SCNC: 12 MMOL/L (ref 10–20)
AST SERPL W P-5'-P-CCNC: 20 U/L (ref 9–39)
BILIRUB SERPL-MCNC: 0.6 MG/DL (ref 0–1.2)
BUN SERPL-MCNC: 15 MG/DL (ref 6–23)
CALCIUM SERPL-MCNC: 9.9 MG/DL (ref 8.6–10.3)
CHLORIDE SERPL-SCNC: 103 MMOL/L (ref 98–107)
CO2 SERPL-SCNC: 28 MMOL/L (ref 21–32)
CREAT SERPL-MCNC: 0.87 MG/DL (ref 0.5–1.05)
EGFRCR SERPLBLD CKD-EPI 2021: 79 ML/MIN/1.73M*2
GLUCOSE SERPL-MCNC: 105 MG/DL (ref 74–99)
POTASSIUM SERPL-SCNC: 4.4 MMOL/L (ref 3.5–5.3)
PROT SERPL-MCNC: 7 G/DL (ref 6.4–8.2)
SODIUM SERPL-SCNC: 139 MMOL/L (ref 136–145)

## 2025-05-06 PROCEDURE — 77080 DXA BONE DENSITY AXIAL: CPT

## 2025-05-06 PROCEDURE — 77080 DXA BONE DENSITY AXIAL: CPT | Performed by: RADIOLOGY

## 2025-05-06 PROCEDURE — 80053 COMPREHEN METABOLIC PANEL: CPT

## 2025-05-07 ENCOUNTER — APPOINTMENT (OUTPATIENT)
Dept: RADIOLOGY | Facility: HOSPITAL | Age: 55
End: 2025-05-07
Payer: MEDICARE

## 2025-05-08 ENCOUNTER — INFUSION (OUTPATIENT)
Dept: HEMATOLOGY/ONCOLOGY | Facility: CLINIC | Age: 55
End: 2025-05-08
Payer: MEDICARE

## 2025-05-08 ENCOUNTER — OFFICE VISIT (OUTPATIENT)
Dept: HEMATOLOGY/ONCOLOGY | Facility: CLINIC | Age: 55
End: 2025-05-08
Payer: MEDICARE

## 2025-05-08 VITALS
SYSTOLIC BLOOD PRESSURE: 99 MMHG | HEART RATE: 62 BPM | WEIGHT: 120.48 LBS | TEMPERATURE: 96.4 F | RESPIRATION RATE: 17 BRPM | OXYGEN SATURATION: 100 % | BODY MASS INDEX: 22.17 KG/M2 | DIASTOLIC BLOOD PRESSURE: 72 MMHG | HEIGHT: 62 IN

## 2025-05-08 DIAGNOSIS — M85.80 OSTEOPENIA, UNSPECIFIED LOCATION: ICD-10-CM

## 2025-05-08 DIAGNOSIS — C50.511 MALIGNANT NEOPLASM OF LOWER-OUTER QUADRANT OF RIGHT FEMALE BREAST, UNSPECIFIED ESTROGEN RECEPTOR STATUS: ICD-10-CM

## 2025-05-08 DIAGNOSIS — M81.0 OSTEOPOROSIS, UNSPECIFIED OSTEOPOROSIS TYPE, UNSPECIFIED PATHOLOGICAL FRACTURE PRESENCE: Primary | ICD-10-CM

## 2025-05-08 DIAGNOSIS — C50.911 INFILTRATING DUCTAL CARCINOMA OF RIGHT BREAST: ICD-10-CM

## 2025-05-08 LAB — CA-I SERPL-MCNC: 5.3 MG/DL (ref 4.7–5.5)

## 2025-05-08 PROCEDURE — 99214 OFFICE O/P EST MOD 30 MIN: CPT

## 2025-05-08 PROCEDURE — 96372 THER/PROPH/DIAG INJ SC/IM: CPT

## 2025-05-08 PROCEDURE — 3078F DIAST BP <80 MM HG: CPT

## 2025-05-08 PROCEDURE — 2500000004 HC RX 250 GENERAL PHARMACY W/ HCPCS (ALT 636 FOR OP/ED): Mod: JZ,TB

## 2025-05-08 PROCEDURE — 3008F BODY MASS INDEX DOCD: CPT

## 2025-05-08 PROCEDURE — 3074F SYST BP LT 130 MM HG: CPT

## 2025-05-08 RX ORDER — EPINEPHRINE 0.3 MG/.3ML
0.3 INJECTION SUBCUTANEOUS EVERY 5 MIN PRN
OUTPATIENT
Start: 2025-11-01

## 2025-05-08 RX ORDER — FAMOTIDINE 10 MG/ML
20 INJECTION, SOLUTION INTRAVENOUS ONCE AS NEEDED
OUTPATIENT
Start: 2025-11-01

## 2025-05-08 RX ORDER — PROPRANOLOL HYDROCHLORIDE 20 MG/1
TABLET ORAL
COMMUNITY
Start: 2014-05-05

## 2025-05-08 RX ORDER — DIPHENHYDRAMINE HYDROCHLORIDE 50 MG/ML
50 INJECTION, SOLUTION INTRAMUSCULAR; INTRAVENOUS AS NEEDED
OUTPATIENT
Start: 2025-11-01

## 2025-05-08 RX ORDER — ALBUTEROL SULFATE 0.83 MG/ML
3 SOLUTION RESPIRATORY (INHALATION) AS NEEDED
OUTPATIENT
Start: 2025-11-01

## 2025-05-08 RX ORDER — ANASTROZOLE 1 MG/1
1 TABLET ORAL DAILY
Qty: 90 TABLET | Refills: 3 | Status: SHIPPED | OUTPATIENT
Start: 2025-05-08

## 2025-05-08 RX ADMIN — DENOSUMAB 60 MG: 60 INJECTION SUBCUTANEOUS at 14:29

## 2025-05-08 ASSESSMENT — PAIN SCALES - GENERAL: PAINLEVEL_OUTOF10: 0-NO PAIN

## 2025-05-08 NOTE — PROGRESS NOTES
Patient seen today by Nellie LINDQUIST prior to injection. Patient denies recent dental procedures. No further needs at this time.  Patient instructed to stop at scheduling.

## 2025-05-08 NOTE — PROGRESS NOTES
Patient ID: Evette Abdul is a 54 y.o. female.    The patient presents to clinic today for her history of breast cancer.     Cancer Staging   Infiltrating ductal carcinoma of right breast  Staging form: Breast, AJCC 8th Edition  - Clinical stage from 11/1/2021: Stage IA (cT1b, cN0, cM0, G1, ER+, HI+, HER2-) - Unsigned      Diagnostic/Therapeutic History:  pT1b pN0 (stage IA) right breast cancer  1. S/p t treatment for a 0.4 cm invasive ductal carcinoma, ER 95%, HI 90%, and HER-2-negative, grade 1. This was diagnosed in November of 2020.   2. S/p adjuvant radiation therapy, completed 2/26/21.  3. Started on anastrozole 3/2021.  4. She has osteoporosis and is on Prolia q6 months, initiated 3/2021.    History of Present Illness (HPI)/Interval History:  Ms. Abdul presents for presents today for follow-up, treatment and surveillance. She is compliant on anastrozole.     She denies any new breast cancer concerns.     She denies any chest pain or breathing issues.     She denies any vision changes or headache issues, dizziness, loss of balance or falls.     She denies any new or unexplained bone aches or pains.    She denies any skin lesions or masses.    She reports a pretty good appetite. She is working on smaller more frequent meals.     She denies any issues with sleep, fatigue, hot flashes or mood swings.     She takes calcium and vitamin d.     She recently started a container garden.     Review of Systems:  14-point ROS otherwise negative, as per HPI.    Past Medical History:   Diagnosis Date    Alcoholism (Multi)     Ascites     Montes's syndrome     Breast cancer (Multi) 10/2020    Right Breast IDC    Disease of thyroid gland 2014    GERD (gastroesophageal reflux disease) 2008    Hx antineoplastic chemo     Hypothyroidism     Insomnia 2023?    Liver disease 2014    Other specified health status     No pertinent past surgical history    Personal history of irradiation     Personal history of malignant neoplasm  of cervix uteri     History of malignant neoplasm of cervix uteri    Personal history of other diseases of the circulatory system     History of hypertension    Personal history of transient ischemic attack (TIA), and cerebral infarction without residual deficits     History of stroke    Pleural effusion 2015?    Stroke (Multi) 2008       Past Surgical History:   Procedure Laterality Date    BI MAMMO GUIDED BREAST RIGHT LOCALIZATION Right 11/25/2020    BI MAMMO GUIDED LOCALIZATION BREAST RIGHT [facility]    BI US GUIDED BREAST LOCALIZATION AND BIOPSY LEFT Left 08/29/2019    BI US GUIDED BREAST LOCALIZATION AND BIOPSY LEFT [facility]    BI US GUIDED BREAST LOCALIZATION AND BIOPSY RIGHT Right 11/09/2020    BI US GUIDED BREAST LOCALIZATION AND BIOPSY RIGHT [facility]    BREAST LUMPECTOMY      LEG SURGERY      left leg fracture repair    ORTHODONTIC TREATMENT  1985?    US GUIDED ABDOMINAL PARACENTESIS  07/19/2014    US GUIDED ABDOMINAL PARACENTESIS [facility]    US GUIDED ABDOMINAL PARACENTESIS  07/22/2014    US GUIDED ABDOMINAL PARACENTESIS [facility]GAC[facility]    US GUIDED ABDOMINAL PARACENTESIS  01/13/2015    US GUIDED ABDOMINAL PARACENTESIS [facility] CLINICAL LEGAC[facility]    US GUIDED ABDOMINAL PARACENTESIS  02/03/2015    US GUIDED ABDOMINAL PARACENTESIS [facility] CLINICAL LEGACY    US GUIDED ABDOMINAL PARACENTESIS  02/25/2015    US GUIDED ABDOMINAL PARACENTESIS [facility]       Social History     Socioeconomic History    Marital status: Single   Tobacco Use    Smoking status: Every Day     Current packs/day: 0.25     Average packs/day: 0.3 packs/day for 39.8 years (10.0 ttl pk-yrs)     Types: Cigarettes     Passive exposure: Current    Smokeless tobacco: Never    Tobacco comments:     Trying to quit   Vaping Use    Vaping status: Never Used   Substance and Sexual Activity    Alcohol use: Not Currently    Drug use: Never    Sexual activity: Not Currently     Partners: Male     Birth  control/protection: Post-menopausal     Social Drivers of Health     Financial Resource Strain: Low Risk  (12/31/2024)    Received from [facility]    Overall Financial Resource Strain (CARDIA)     Difficulty of Paying Living Expenses: Not very hard   Food Insecurity: No Food Insecurity (12/31/2024)    Received from [facility]    Hunger Vital Sign     Worried About Running Out of Food in the Last Year: Never true     Ran Out of Food in the Last Year: Never true   Transportation Needs: No Transportation Needs (12/31/2024)    Received from [facility]    PRAPARE - Transportation     Lack of Transportation (Medical): No     Lack of Transportation (Non-Medical): No   Physical Activity: Sufficiently Active (12/31/2024)    Received from [facility]    Exercise Vital Sign     Days of Exercise per Week: 6 days     Minutes of Exercise per Session: 30 min   Stress: Stress Concern Present (12/31/2024)    Received from [facility]    [place] [employer] - Occupational Stress Questionnaire     Feeling of Stress : To some extent   Social Connections: Moderately Integrated (12/31/2024)    Received from [facility]    Social Connection and Isolation Panel [NHANES]     Frequency of Communication with Friends and Family: Once a week     Frequency of Social Gatherings with Friends and Family: More than three times a week     Attends [employer] Services: More than 4 times per year     Active Member of Clubs or Organizations: No     Attends Club or Organization Meetings: Never     Marital Status: Living with partner   Intimate Partner Violence: Not At Risk (12/31/2024)    Received from [facility]    Humiliation, Afraid, Rape, and Kick questionnaire     Fear of Current or Ex-Partner: No     Emotionally Abused: No     Physically Abused: No     Sexually Abused: No   Housing Stability: Low Risk  (12/31/2024)    Received from [facility]    Housing Stability Vital Sign     Unable to Pay for Housing in the Last Year: No  "    Number of Times Moved in the Last Year: 0     Homeless in the Last Year: No       Allergies   Allergen Reactions    Acetaminophen Unknown    Aspirin Unknown    Codeine Unknown    Ibuprofen Unknown    Tramadol Unknown         Current Outpatient Medications:     anastrozole (Arimidex) 1 mg tablet, Take 1 tablet (1 mg total) by mouth once daily., Disp: 90 tablet, Rfl: 3    atorvastatin (Lipitor) 10 mg tablet, Take 1 tablet (10 mg) by mouth once daily., Disp: 30 tablet, Rfl: 11    calcium carbonate-vitamin D3 600 mg-20 mcg (800 unit) tablet, Take 1 tablet by mouth once daily with breakfast., Disp: , Rfl:     denosumab (Prolia) 60 mg/mL syringe, Inject under the skin., Disp: , Rfl:     furosemide (Lasix) 20 mg tablet, Take 1 tablet (20 mg) by mouth 2 times a day., Disp: , Rfl:     lactulose 20 gram/30 mL oral solution, Take 15 mL (10 g) by mouth 3 times a day., Disp: , Rfl:     levothyroxine (Synthroid, Levoxyl) 125 mcg tablet, TAKE 1/2 TABLET DAILY, Disp: 50 tablet, Rfl: 2    omeprazole (PriLOSEC) 40 mg DR capsule, Take 1 capsule (40 mg) by mouth once daily., Disp: , Rfl:     propranolol (Inderal) 20 mg tablet, , Disp: , Rfl:     spironolactone (Aldactone) 50 mg tablet, Take 1 tablet (50 mg) by mouth 2 times a day., Disp: , Rfl:     traZODone (Desyrel) 150 mg tablet, Take 1 tablet (150 mg) by mouth as needed at bedtime for sleep., Disp: 30 tablet, Rfl: 0    multivit with minerals/lutein (MULTIVITAMIN 50 PLUS ORAL), , Disp: , Rfl:      Objective    BSA: 1.54 meters squared  BP 99/72 (BP Location: Left arm, Patient Position: Sitting, BP Cuff Size: Adult)   Pulse 62   Temp 35.8 °C (96.4 °F) (Temporal)   Resp 17   Ht (S) 1.571 m (5' 1.85\")   Wt 54.6 kg (120 lb 7.7 oz)   SpO2 100%   BMI 22.14 kg/m²     Performance Status:  The ECOG performance scale today is ECO- Fully active, able to carry on all pre-disease performance w/o restriction.      Physical Exam  Vitals reviewed.   Constitutional:       General: She " is awake. She is not in acute distress.     Appearance: Normal appearance. She is not ill-appearing.   HENT:      Mouth/Throat:      Pharynx: Oropharynx is clear. No oropharyngeal exudate.   Eyes:      General: No scleral icterus.     Conjunctiva/sclera: Conjunctivae normal.   Neck:      Trachea: Trachea and phonation normal. No tracheal tenderness.   Cardiovascular:      Rate and Rhythm: Normal rate and regular rhythm.      Heart sounds: No murmur heard.     No friction rub. No gallop.   Pulmonary:      Effort: Pulmonary effort is normal. No respiratory distress.      Breath sounds: Normal breath sounds. No stridor. No wheezing, rhonchi or rales.   Chest:      Chest wall: No mass, lacerations, deformity or tenderness.   Breasts:     Right: Skin change (skin dimpling along incision site) present. No swelling, mass, nipple discharge or tenderness.      Left: No swelling, mass, nipple discharge, skin change or tenderness.      Comments: S/p right lumpectomy   Abdominal:      General: Abdomen is flat. There is no distension.      Palpations: Abdomen is soft. There is no mass.      Tenderness: There is no abdominal tenderness.   Lymphadenopathy:      Cervical: No cervical adenopathy.      Upper Body:      Right upper body: No supraclavicular, axillary or pectoral adenopathy.      Left upper body: No supraclavicular, axillary or pectoral adenopathy.   Skin:     General: Skin is warm and dry.      Coloration: Skin is not jaundiced.      Findings: No lesion or rash.   Neurological:      General: No focal deficit present.      Mental Status: She is alert and oriented to person, place, and time.      Motor: No weakness.      Gait: Gait normal.   Psychiatric:         Mood and Affect: Mood normal.         Thought Content: Thought content normal.         Judgment: Judgment normal.         Laboratory Data:  Lab Results   Component Value Date    WBC 8.3 04/05/2024    HGB 15.6 04/05/2024    HCT 45.6 04/05/2024    MCV 91 04/05/2024      04/05/2024       Chemistry    Lab Results   Component Value Date/Time     05/06/2025 1115    K 4.4 05/06/2025 1115     05/06/2025 1115    CO2 28 05/06/2025 1115    BUN 15 05/06/2025 1115    CREATININE 0.87 05/06/2025 1115    Lab Results   Component Value Date/Time    CALCIUM 9.9 05/06/2025 1115    ALKPHOS 44 05/06/2025 1115    AST 20 05/06/2025 1115    ALT 16 05/06/2025 1115    BILITOT 0.6 05/06/2025 1115             Radiology:  XR DEXA bone density  Narrative: Interpreted By:  Alana Denny,   STUDY:  DEXA BONE DENSITY5/6/2025 11:23 am      INDICATION:  Signs/Symptoms:menopause / ai use. The patient is a 53 y/o  year old  F. Postmenopausal with history of osteoporosis treatment      ,C50.911 Malignant neoplasm of unspecified site of right female  breast,M85.80 Other specified disorders of bone density and  structure, unspecified site,Z78.0 Asymptomatic menopausal state      COMPARISON:  04/03/2023      ACCESSION NUMBER(S):  [id number]      ORDERING CLINICIAN:  ROLANDO HUERTA      TECHNIQUE:  DEXA BONE DENSITY      FINDINGS:  SPINE L1-L4  Bone Mineral Density: 1.107 g/cm2  T-Score 0.5  Z-Score 1.7  Bone Mineral Density change vs baseline:  3.4 %  Bone Mineral Density change vs previous: -0.9 %      LEFT FEMUR -TOTAL  Bone Mineral Density: 0.765 g/cm2  T-Score -1.5   Z-Score  -0.7  Bone Mineral Density change vs baseline: 6.1 %  Bone Mineral Density change vs previous: 0.9 %      LEFT FEMUR -NECK  Bone Mineral Density: 0.536 g/cm2  T-Score -2.8  Z-Score -1.7  Bone Mineral Density change vs baseline: -2.7 %  Bone Mineral Density change vs previous: -4.3 %          World Health Organization (WHO) criteria for post-menopausal,   Women:  Normal:         T-score at or above -1 SD  Osteopenia:   T-score between -1 and -2.5 SD  Osteoporosis: T-score at or below -2.5 SD          10-year Fracture Risk:  Major Osteoporotic Fracture  9.9 % without prior fracture and 17%  with prior fracture Hip  Fracture                        2.3 % without  prior fracture and 4.4% with prior fracture      Note:  If no FRAX score is reported, it is because:  Some T-score for Spine Total or Hip Total or Femoral Neck at or below  -2.5      Impression: DEXA:  According to World Health Organization criteria,  classification is osteoporosis.      Followup recommended in two years or sooner as clinically warranted.      All images and detailed analysis are available on the  Radiology  PACS.      MACRO:  None      Signed by: Alana Denny 5/6/2025 2:50 PM  Dictation workstation:   [id number]       BI mammo bilateral screening tomosynthesis 10/03/2023    Narrative  Interpreted By:  Alana Denny,  STUDY:  BI MAMMO BILATERAL SCREENING TOMOSYNTHESIS;  10/3/2023 2:32 pm    ACCESSION NUMBER(S):  [id number]    ORDERING CLINICIAN:  PATRICE BORDEN    INDICATION:  Screening. Personal history of breast carcinoma    COMPARISON:  10/03/2022 05/02/2022, 10/01/2021    TECHNIQUE:  Digital routine screening MLO and CC projections were obtained  bilaterally. Tomosynthesis was also utilized. The images were  processed utilizing computer aided detection system.    FINDINGS:  Density:  There are areas of scattered fibroglandular tissue.    Within the posterior depth of the right breast laterally, there is  postlumpectomy change identified. Benign calcifications are present  within the right breast. On the left, there is biopsy clip seen.  Benign density within the left axillary tail is present.  No  suspicious masses or calcifications are identified.    ACR breast density category B    Impression  No mammographic evidence of malignancy.    BI-RADS CATEGORY:    BI-RADS Category:  2 Benign.  Recommendation:  Routine Screening Mammogram in 1 Year.  Recommended Date:  1 Year.  Laterality:  Bilateral.    For any future breast imaging appointments, please call [phone]).      MACRO:  None    Signed by: Alana Denny 10/3/2023 2:52 PM  Dictation  workstation:   [id number]          Assessment/Plan:    Evette Abudl is a 54 y.o. female with a history of right sided breast cancer, who presents today follow-up evaluation.    Right breast cancer   - Continue anastrozole, refilled today   - Mammogram 10/2024 benign. Continues yearly screenings and follow up with Dr. Newsome     There is no evidence of breast cancer recurrence based on her clinical exam today.     Osteoporosis   DEXA 5/6/2025: -2.8   - Prolia today   - Prolia does not seem to be making a difference in her bone density, refer to endocrinology/rheumatology. Plan to treat for now      Assess/Plan SmartLinks:   Problem List Items Addressed This Visit           ICD-10-CM    Infiltrating ductal carcinoma of right breast C50.911     Disposition.  - RTC 6 months, same day as prolia injection with Nellie Diehl PA-C   - She was given our contact information and instructed to call with concerns/questions in the interim.    No orders of the defined types were placed in this encounter.    Nellie Diehl PA-C  Hematology and Medical Oncology  [facility]

## 2025-05-15 ENCOUNTER — OFFICE VISIT (OUTPATIENT)
Dept: PRIMARY CARE | Facility: CLINIC | Age: 55
End: 2025-05-15
Payer: MEDICARE

## 2025-05-15 VITALS
WEIGHT: 119.8 LBS | HEART RATE: 69 BPM | BODY MASS INDEX: 22.62 KG/M2 | DIASTOLIC BLOOD PRESSURE: 72 MMHG | SYSTOLIC BLOOD PRESSURE: 118 MMHG | OXYGEN SATURATION: 99 % | TEMPERATURE: 97.1 F | HEIGHT: 61 IN

## 2025-05-15 DIAGNOSIS — Z71.85 VACCINE COUNSELING: ICD-10-CM

## 2025-05-15 DIAGNOSIS — Z85.3 HISTORY OF BREAST CANCER: ICD-10-CM

## 2025-05-15 DIAGNOSIS — Z13.6 SCREENING FOR CARDIOVASCULAR CONDITION: ICD-10-CM

## 2025-05-15 DIAGNOSIS — E55.9 VITAMIN D DEFICIENCY: Primary | ICD-10-CM

## 2025-05-15 DIAGNOSIS — Z23 ENCOUNTER FOR IMMUNIZATION: ICD-10-CM

## 2025-05-15 DIAGNOSIS — K70.30 ALCOHOLIC CIRRHOSIS, UNSPECIFIED WHETHER ASCITES PRESENT (MULTI): ICD-10-CM

## 2025-05-15 DIAGNOSIS — E78.49 OTHER HYPERLIPIDEMIA: ICD-10-CM

## 2025-05-15 DIAGNOSIS — Z00.00 ANNUAL PHYSICAL EXAM: ICD-10-CM

## 2025-05-15 DIAGNOSIS — R73.9 ELEVATED SERUM GLUCOSE: ICD-10-CM

## 2025-05-15 DIAGNOSIS — E03.9 HYPOTHYROIDISM, UNSPECIFIED TYPE: ICD-10-CM

## 2025-05-15 PROCEDURE — G0439 PPPS, SUBSEQ VISIT: HCPCS | Performed by: STUDENT IN AN ORGANIZED HEALTH CARE EDUCATION/TRAINING PROGRAM

## 2025-05-15 PROCEDURE — 99396 PREV VISIT EST AGE 40-64: CPT | Performed by: STUDENT IN AN ORGANIZED HEALTH CARE EDUCATION/TRAINING PROGRAM

## 2025-05-15 PROCEDURE — 3074F SYST BP LT 130 MM HG: CPT | Performed by: STUDENT IN AN ORGANIZED HEALTH CARE EDUCATION/TRAINING PROGRAM

## 2025-05-15 PROCEDURE — 99396 PREV VISIT EST AGE 40-64: CPT | Mod: CSA | Performed by: STUDENT IN AN ORGANIZED HEALTH CARE EDUCATION/TRAINING PROGRAM

## 2025-05-15 PROCEDURE — 3078F DIAST BP <80 MM HG: CPT | Performed by: STUDENT IN AN ORGANIZED HEALTH CARE EDUCATION/TRAINING PROGRAM

## 2025-05-15 PROCEDURE — 99214 OFFICE O/P EST MOD 30 MIN: CPT | Performed by: STUDENT IN AN ORGANIZED HEALTH CARE EDUCATION/TRAINING PROGRAM

## 2025-05-15 PROCEDURE — 99215 OFFICE O/P EST HI 40 MIN: CPT | Performed by: STUDENT IN AN ORGANIZED HEALTH CARE EDUCATION/TRAINING PROGRAM

## 2025-05-15 PROCEDURE — 99214 OFFICE O/P EST MOD 30 MIN: CPT | Mod: 25 | Performed by: STUDENT IN AN ORGANIZED HEALTH CARE EDUCATION/TRAINING PROGRAM

## 2025-05-15 PROCEDURE — 3008F BODY MASS INDEX DOCD: CPT | Performed by: STUDENT IN AN ORGANIZED HEALTH CARE EDUCATION/TRAINING PROGRAM

## 2025-05-15 PROCEDURE — 90471 IMMUNIZATION ADMIN: CPT | Performed by: STUDENT IN AN ORGANIZED HEALTH CARE EDUCATION/TRAINING PROGRAM

## 2025-05-15 ASSESSMENT — PAIN SCALES - GENERAL: PAINLEVEL_OUTOF10: 0-NO PAIN

## 2025-05-15 NOTE — PROGRESS NOTES
"Subjective   Evette Abdul is a 54 y.o. female who presents for NPV.    HPI:      PREVENTATIVE HEALTH CARE:    Immunizations:  COVID:  DUE  TDaP:  utd  Pneumonia:  utd  Shingles:  DUE    Immunization History   Administered Date(s) Administered    Flu vaccine (IIV4), preservative free *Check age/dose* 12/09/2021    Flu vaccine, quadrivalent, high-dose, preservative free, age 65y+ (FLUZONE) 11/13/2020    Hepatitis A vaccine, age 19 years and greater (HAVRIX) 03/23/2016, 10/17/2016    Hepatitis B vaccine, adult *Check Product/Dose* 04/20/2016, 10/17/2016    Influenza, Unspecified 10/18/2006, 12/09/2021    Influenza, injectable, quadrivalent 10/17/2016    Influenza, seasonal, injectable 01/30/2013, 09/17/2015    Moderna SARS-CoV-2 Vaccination 04/06/2021, 05/04/2021, 12/30/2021    PPD Test 05/08/2008    Pneumococcal conjugate vaccine, 20-valent (PREVNAR 20) 04/29/2024    Tdap vaccine, age 7 year and older (BOOSTRIX, ADACEL) 06/07/2017    Zoster vaccine, recombinant, adult (SHINGRIX) 05/15/2025       Screenings:  Pap:  4/9/2025 wnl, HPV negative - utd  Mammogram:  Hx of breast cancer, follows with Qian Newsome MD  Colonoscopy:  9/24/2019 wnl - 10 yr follow up - utd  DExA:  5/6/2025 - osteoporosis, on Prolia currently sending her to rheumatologist    Gallstones:  Monitored every 6 months.  Changed diet    Hashimotos  Taking levothyroxine 125 mcg daily.  Lab Results   Component Value Date    TSH 1.15 05/14/2024     Cirrhosis with history of ascities and pleural effusion  Sober since 2017.        ROS:    Review of systems is essentially negative for all systems except for any identified issues in HPI above.    Objective     /72   Pulse 69   Temp 36.2 °C (97.1 °F)   Ht 1.549 m (5' 1\")   Wt 54.3 kg (119 lb 12.8 oz)   SpO2 99%   BMI 22.64 kg/m²      PHYSICAL EXAM    GENERAL  Well-appearing, pleasant and cooperative.  No acute distress.    HEENT  HEAD:   Normocephalic.  Atraumatic.  EYES:  PERRLA.  No scleral " icterus or conjunctival injection.  EARS:  Tympanic membranes visualized bilaterally without erythema, fluid, or bulging.  NECK:  No adenopathy.  No palpable thyroid enlargement or nodules.    THROAT:  Moist oropharynx without tonsillar enlargement or exudates.    LUNGS:    Clear to auscultation bilaterally.  No wheezes, rales, rhonchi.    CARDIAC:  Regular rate and rhythm.  Normal S1S2.  No murmurs/rubs/gallops.    ABDOMEN:  Soft, non-tender, non-distended.  No hepatosplenomegaly.  Normoactive bowel sounds.    MUSCULOSKELETAL:  No gross abnormalities.   No joint swelling or erythema,.  No spinal or paraspinal tenderness to palpation.    EXTREMITIES:  No LE edema or cyanosis.      NEURO           Alert and oriented x3. No focal deficits.    PSYCH:          Affect appropriate.           Assessment/Plan   Problem List Items Addressed This Visit       Hepatic cirrhosis (Multi)    Relevant Orders    CBC    Comprehensive Metabolic Panel    Other hyperlipidemia    Continue atorvastatin 10 mg daily along with lifestyle measures.         Relevant Orders    Lipid Panel    Hypothyroidism    Continue levothyroxine 125 mcg daily.  TSH/reflex T4 ordered today.         Relevant Orders    TSH with reflex to Free T4 if abnormal    Vitamin D deficiency - Primary    Relevant Orders    Vitamin D 25-Hydroxy,Total (for eval of Vitamin D levels)     Other Visit Diagnoses         Annual physical exam        Relevant Orders    Hepatitis C Antibody    HIV 1/2 Antigen/Antibody Screen with Reflex to Confirmation    Lipid Panel    CBC    Comprehensive Metabolic Panel      Screening for cardiovascular condition        Relevant Orders    Lipid Panel      History of breast cancer        Follows with Qian Newsome MD.      Vaccine counseling        Relevant Orders    Zoster vaccine, recombinant, adult (SHINGRIX) (Completed)      Encounter for immunization        Relevant Orders    Zoster vaccine, recombinant, adult (SHINGRIX) (Completed)           Patient Instructions   It was a pleasure to meet you today.    Shingles vaccination (Shingrix) dose #1 in clinic today.  Return for nurse visit in 2 to 6 months for second and final shingles dose.    Go to the lab for fasting blood work, we will notify you with all results.    Follow-up with other specialists as scheduled.    Routine follow-up/medication review with me in 6 months, sooner if needed.    Counseling:   Medication education:    Education: The patient is counseled regarding potential side effects of all new medications.    Understanding:  Patient expressed understanding.    Adherence:  No barriers to adherence identified.         Nathalie Meneses MD

## 2025-05-15 NOTE — PATIENT INSTRUCTIONS
It was a pleasure to meet you today.    Shingles vaccination (Shingrix) dose #1 in clinic today.  Return for nurse visit in 2 to 6 months for second and final shingles dose.    Go to the lab for fasting blood work, we will notify you with all results.    Follow-up with other specialists as scheduled.    Routine follow-up/medication review with me in 6 months, sooner if needed.

## 2025-05-21 LAB
25(OH)D3+25(OH)D2 SERPL-MCNC: 74 NG/ML (ref 30–100)
ALBUMIN SERPL-MCNC: 4.5 G/DL (ref 3.6–5.1)
ALP SERPL-CCNC: 52 U/L (ref 37–153)
ALT SERPL-CCNC: 12 U/L (ref 6–29)
ANION GAP SERPL CALCULATED.4IONS-SCNC: 10 MMOL/L (CALC) (ref 7–17)
AST SERPL-CCNC: 16 U/L (ref 10–35)
BILIRUB SERPL-MCNC: 0.6 MG/DL (ref 0.2–1.2)
BUN SERPL-MCNC: 13 MG/DL (ref 7–25)
CALCIUM SERPL-MCNC: 9.7 MG/DL (ref 8.6–10.4)
CHLORIDE SERPL-SCNC: 103 MMOL/L (ref 98–110)
CHOLEST SERPL-MCNC: 117 MG/DL
CHOLEST/HDLC SERPL: 3.3 (CALC)
CO2 SERPL-SCNC: 27 MMOL/L (ref 20–32)
CREAT SERPL-MCNC: 0.81 MG/DL (ref 0.5–1.03)
EGFRCR SERPLBLD CKD-EPI 2021: 86 ML/MIN/1.73M2
ERYTHROCYTE [DISTWIDTH] IN BLOOD BY AUTOMATED COUNT: 11.9 % (ref 11–15)
GLUCOSE SERPL-MCNC: 113 MG/DL (ref 65–99)
HBA1C MFR BLD: 5.9 %
HCT VFR BLD AUTO: 44.1 % (ref 35–45)
HCV AB SERPL QL IA: NORMAL
HDLC SERPL-MCNC: 36 MG/DL
HGB BLD-MCNC: 14.9 G/DL (ref 11.7–15.5)
HIV 1+2 AB+HIV1 P24 AG SERPL QL IA: NORMAL
LDLC SERPL CALC-MCNC: 63 MG/DL (CALC)
MCH RBC QN AUTO: 31.2 PG (ref 27–33)
MCHC RBC AUTO-ENTMCNC: 33.8 G/DL (ref 32–36)
MCV RBC AUTO: 92.3 FL (ref 80–100)
NONHDLC SERPL-MCNC: 81 MG/DL (CALC)
PLATELET # BLD AUTO: 223 THOUSAND/UL (ref 140–400)
PMV BLD REES-ECKER: 10.1 FL (ref 7.5–12.5)
POTASSIUM SERPL-SCNC: 4.2 MMOL/L (ref 3.5–5.3)
PROT SERPL-MCNC: 7.4 G/DL (ref 6.1–8.1)
RBC # BLD AUTO: 4.78 MILLION/UL (ref 3.8–5.1)
SODIUM SERPL-SCNC: 140 MMOL/L (ref 135–146)
TRIGL SERPL-MCNC: 92 MG/DL
TSH SERPL-ACNC: 2.29 MIU/L
WBC # BLD AUTO: 6.8 THOUSAND/UL (ref 3.8–10.8)

## 2025-05-22 ENCOUNTER — PATIENT MESSAGE (OUTPATIENT)
Dept: PRIMARY CARE | Facility: CLINIC | Age: 55
End: 2025-05-22
Payer: MEDICARE

## 2025-05-22 DIAGNOSIS — E78.2 COMBINED HYPERLIPIDEMIA: ICD-10-CM

## 2025-05-22 RX ORDER — ATORVASTATIN CALCIUM 10 MG/1
10 TABLET, FILM COATED ORAL DAILY
Qty: 90 TABLET | Refills: 3 | Status: SHIPPED | OUTPATIENT
Start: 2025-05-22 | End: 2026-05-22

## 2025-07-15 ENCOUNTER — APPOINTMENT (OUTPATIENT)
Dept: PRIMARY CARE | Facility: CLINIC | Age: 55
End: 2025-07-15
Payer: MEDICARE

## 2025-07-23 ENCOUNTER — TELEPHONE (OUTPATIENT)
Dept: RHEUMATOLOGY | Facility: CLINIC | Age: 55
End: 2025-07-23

## 2025-07-23 ENCOUNTER — APPOINTMENT (OUTPATIENT)
Dept: RHEUMATOLOGY | Facility: CLINIC | Age: 55
End: 2025-07-23
Payer: MEDICARE

## 2025-07-23 VITALS
WEIGHT: 109 LBS | BODY MASS INDEX: 20.06 KG/M2 | OXYGEN SATURATION: 99 % | HEIGHT: 62 IN | SYSTOLIC BLOOD PRESSURE: 94 MMHG | HEART RATE: 54 BPM | DIASTOLIC BLOOD PRESSURE: 94 MMHG

## 2025-07-23 DIAGNOSIS — M81.0 OSTEOPOROSIS, UNSPECIFIED OSTEOPOROSIS TYPE, UNSPECIFIED PATHOLOGICAL FRACTURE PRESENCE: Primary | ICD-10-CM

## 2025-07-23 PROCEDURE — 3074F SYST BP LT 130 MM HG: CPT | Performed by: STUDENT IN AN ORGANIZED HEALTH CARE EDUCATION/TRAINING PROGRAM

## 2025-07-23 PROCEDURE — 99205 OFFICE O/P NEW HI 60 MIN: CPT | Performed by: STUDENT IN AN ORGANIZED HEALTH CARE EDUCATION/TRAINING PROGRAM

## 2025-07-23 PROCEDURE — 3080F DIAST BP >= 90 MM HG: CPT | Performed by: STUDENT IN AN ORGANIZED HEALTH CARE EDUCATION/TRAINING PROGRAM

## 2025-07-23 PROCEDURE — 3008F BODY MASS INDEX DOCD: CPT | Performed by: STUDENT IN AN ORGANIZED HEALTH CARE EDUCATION/TRAINING PROGRAM

## 2025-07-23 NOTE — PATIENT INSTRUCTIONS
If you are not having issues with the Prolia, I would recommend continuing it indefinitely with repeat bone densities every two years    [facility]    Please get labs before each Prolia, please do fasting for this first one    Your next Prolia is due around 11/8/2025, please followup with the infusion center to make sure its scheduled    Calcium should be calcium citrate. You should not use calcium carbonate.  Calcium carbonate does not absorb well for patients on prilosec    For Vitamin D3, make sure you're taking 2000 international units  a day     I will order it today

## 2025-07-23 NOTE — LETTER
To Dr Crane,     I am this patients rheumatologist; She is due for Prolia 11/8/2025; It should not be delayed by over a month    If you think that her September 2025 implant and extraction will be healed enough by 11/8/2025-12/8/2025 , please go ahead and continue    However, usually, I recommend that patient get their Prolia dose, and then the invasive dental work 1-2 months after, so that they do not miss the Prolia dose (It is given every 6 months). This is based off the practice from the [facility] [place],    Missing a Prolia dose by over 1 month is associated with increased risk of rebound bone loss and fracture    Please call my office at [phone] and request to speak with me if any issues or questions; thank you     Jessica Denise MD   Rheumatology

## 2025-07-23 NOTE — PROGRESS NOTES
"Subjective   Patient ID: Evette Abdul is a 54 y.o. female who presents for New Patient Visit (Osteoporosis/Requesting a medication in place of the Prolia).  HPI:  New consult from [facility] for osteoporosis    Male with a history of vitamin D deficiency, stroke in 2008, hypothyroidism on levothyroxine, hyperlipidemia on atorvastatin, cirrhosis but sober now since 2017,,Barrets esophagus, esophageal varices, ascites, follow with GI/liver [facility],  osteoporosis started on Prolia by oncology 2021, infiltrating ductal carcinoma of the right breast status post resection, adjuvant radiation therapy, on anastrozole since 2021 (will likely stay on until 2026), RVR and is on propanolol,  here to establish care for osteoporosis    Personal or family history of fragility fracture?         Fractured L tib/fib and they set it under xray, fractured it from falling on ice (possibly not fragility)                                                     Hx of hypothyroidism/hyperthyroidism/hyperparathyroidism/hypoparathyroidism? Low thryoid on levothyroxine  Hx of GI , renal , liver, collagen or malignancy issues?  Has cirrhosis, had breast cancer, hashimotos                        Accelerated bone loss meds ?    anti-seizure/ depression meds, immunosuppressants , anti- aromatase inhibitors, Thyroid replacement therapy, anticoagulants?   steroids?     Prolia since 2021, nothing before                     Anti-resorptive meds? Bisphosphonates, estrogen, Calcitonin-no  dental hygiene? no  dental work planned? Had an extract in 9.2024; and implant planned in 9/2025;  Dairy (Y)  Ca/VitD () ETOH (takes calcium and vitamin D ) Tobacco (Y)    Menopause? Age 44  Heart attack or stroke?history of stroke in 2008  History of Kidney Stones?no    Objective   BP (!) 94/94 (BP Location: Left arm, Patient Position: Sitting, BP Cuff Size: Adult)   Pulse 54   Ht 1.575 m (5' 2\")   Wt 49.4 kg (109 lb)   SpO2 99%   BMI 19.94 " "kg/m²       Physical Exam  Constitutional: Alert and in no acute distress. Well developed, well nourished  Lab Results   Component Value Date    WBC 6.8 05/19/2025    HGB 14.9 05/19/2025    HCT 44.1 05/19/2025     05/19/2025    ALT 12 05/19/2025    AST 16 05/19/2025    CREATININE 0.81 05/19/2025          No results found for: \"ANANP\", \"ANATITERADD\", \"ANACO\", \"ANAPATTRN\", \"ANPA2\", \"FANAP\", \"ANATITER\", \"ANAT2\", \"OTIS\", \"CDCANA\", \"DSDN\", \"EMPSMRNP\", \"SCLN\", \"SCLABQ\", \"SCIB\", \"CTIB\", \"XBVSZB21\", \"BTIPUR29\", \"ASSB\", \"SSBB\", \"C3\", \"C4\", \"UAMICCOMM\", \"UTPCR\", \"ANTIRIBO\", \"ACEN\", \"SEDRATE\", \"NONUHFIRE\", \"POCESR\", \"CRP\", \"RF\", \"CCPIGGQUAL\"\\            There is currently no information documented on the homunculus. Go to the Rheumatology activity and complete the homunculus joint exam.      XR DEXA bone density  Narrative: Interpreted By:  Alana Denny,   STUDY:  DEXA BONE DENSITY5/6/2025 11:23 am      INDICATION:  Signs/Symptoms:menopause / ai use. The patient is a 55 y/o  year old  F. Postmenopausal with history of osteoporosis treatment      ,C50.911 Malignant neoplasm of unspecified site of right female  breast,M85.80 Other specified disorders of bone density and  structure, unspecified site,Z78.0 Asymptomatic menopausal state      COMPARISON:  04/03/2023      ACCESSION NUMBER(S):  [id number]      ORDERING CLINICIAN:  ROLANDO HUERTA      TECHNIQUE:  DEXA BONE DENSITY      FINDINGS:  SPINE L1-L4  Bone Mineral Density: 1.107 g/cm2  T-Score 0.5  Z-Score 1.7  Bone Mineral Density change vs baseline:  3.4 %  Bone Mineral Density change vs previous: -0.9 %      LEFT FEMUR -TOTAL  Bone Mineral Density: 0.765 g/cm2  T-Score -1.5   Z-Score  -0.7  Bone Mineral Density change vs baseline: 6.1 %  Bone Mineral Density change vs previous: 0.9 %      LEFT FEMUR -NECK  Bone Mineral Density: 0.536 g/cm2  T-Score -2.8  Z-Score -1.7  Bone Mineral Density change vs baseline: -2.7 %  Bone Mineral Density change vs previous: -4.3 %    "       World Health Organization (WHO) criteria for post-menopausal,   Women:  Normal:         T-score at or above -1 SD  Osteopenia:   T-score between -1 and -2.5 SD  Osteoporosis: T-score at or below -2.5 SD          10-year Fracture Risk:  Major Osteoporotic Fracture  9.9 % without prior fracture and 17%  with prior fracture Hip Fracture                        2.3 % without  prior fracture and 4.4% with prior fracture      Note:  If no FRAX score is reported, it is because:  Some T-score for Spine Total or Hip Total or Femoral Neck at or below  -2.5      Impression: DEXA:  According to World Health Organization criteria,  classification is osteoporosis.      Followup recommended in two years or sooner as clinically warranted.      All images and detailed analysis are available on the  Radiology  PACS.      MACRO:  None      Signed by: Alana Denny 5/6/2025 2:50 PM  Dictation workstation:   [id number]      === 05/06/25 ===    DEXA BONE DENSITY    - Impression -  DEXA:  According to World Health Organization criteria,  classification is osteoporosis.    Followup recommended in two years or sooner as clinically warranted.    All images and detailed analysis are available on the  Radiology  PACS.    MACRO:  None    Signed by: Alana Denny 5/6/2025 2:50 PM  Dictation workstation:   [id number]    Assessment/Plan:  #Osteoporosis, no fragility fracture  -Factors: Age, history of heavy alcohol use (sober since 2017), current smoking, anastrozole, low BMI  - Has been on Prolia since 2021 per oncology without issue  -Risks of denosumab discussed including but not limited to back pain, hand pain, feet  pain, , low calcium, jaw osteonecrosis, atypical femur fracture, allergies, risk of rebound bone loss if patient is not completely adherent with injection schedule. Patient understanding and aware of risks  - Repeat bone densities every 2 years, next bone density due around May 6 2027 or so at [facility]; can order at next visit  - Discussed that safety data for Prolia currently extends for 10 years, but if she is doing well on it, given that she is osteoporotic, I would continue  - Discussed quitting smoking  -1 year Prolia requested to be ordered at [facility] in [place] July 2025, she is due for her next Prolia around November 8, 2025.  CMP x 2 ordered July 2025  -Risks of denosumab discussed including but not limited to back pain, hand pain, feet  pain, , low calcium, jaw osteonecrosis, atypical femur fracture, allergies, risk of rebound bone loss if patient is not completely adherent with injection schedule. Patient understanding and aware of risks  -Secondary labs:Vit D normal to 90, TSH normal,  -will get PTH and fasting CTX with next lab-ordered July 2025      #Planned extraction/implant in 9/2025  - I do not recommend delaying her Prolia by over a month,she is due for prolia around 11/8/2025  - Usually, I recommend giving Prolia as planned, delaying invasive dental work for 1 to 2 months after the Prolia shot isgiven, so that Prolia is not delayed.  Missing Prolia by over a month is associated with increased risk of rebound bone loss and vertebral fracture  - I gave patient a letter today 7/2025 to give to her dentist Dr. Lambert helps with this following information  To Dr Crane,     I am this patients rheumatologist; She is due for Prolia 11/8/2025; It should not be delayed by over a month    If you think that her September 2025 implant and extraction will be healed enough by 11/8/2025-12/8/2025 , please go ahead and continue    However, usually, I recommend that patient get their Prolia dose, and then the invasive dental work 1-2 months after, so that they do not miss the Prolia dose (It is given every 6 months). This is based off the practice from the [facility],    Missing a Prolia dose by over 1 month is associated with increased risk of rebound bone  loss and fracture    Please call my office at [phone] and request to speak with me if any issues or questions; thank you     Jessica Denise MD   Rheumatology    Patient counseled to seek medical care if any new or worsening symptoms, urgently if needed.      Note will be sent to primary care doctor and referring provider    Return to clinic in1 year sooner if needed    Total time on this day of visit includes record and documentation review before and after visit including documentation and time not explicitly included on EMR time stamp.     Dragon dictation software was used to dictate this note. Errors may have occurred during dictation that was not intended by the user.

## 2025-07-24 DIAGNOSIS — C50.511 MALIGNANT NEOPLASM OF LOWER-OUTER QUADRANT OF RIGHT FEMALE BREAST, UNSPECIFIED ESTROGEN RECEPTOR STATUS: Primary | ICD-10-CM

## 2025-07-24 NOTE — PROGRESS NOTES
Per Dr. Denise:   Hi specialty pharmacy team, are you able to order Prolia for 1 year for this patient? She goes to [facility] in [place]. Are you able to order there? She has been getting it there since 2021. She is next due around November 8, 2025. Thank you.     Updating orders to be under Dr. Denise.

## 2025-08-21 ENCOUNTER — OFFICE VISIT (OUTPATIENT)
Dept: PRIMARY CARE | Facility: CLINIC | Age: 55
End: 2025-08-21
Payer: MEDICARE

## 2025-08-21 VITALS
OXYGEN SATURATION: 99 % | SYSTOLIC BLOOD PRESSURE: 90 MMHG | DIASTOLIC BLOOD PRESSURE: 62 MMHG | HEIGHT: 62 IN | WEIGHT: 111 LBS | HEART RATE: 63 BPM | BODY MASS INDEX: 20.43 KG/M2 | TEMPERATURE: 96.1 F

## 2025-08-21 DIAGNOSIS — Z23 ENCOUNTER FOR IMMUNIZATION: ICD-10-CM

## 2025-08-21 DIAGNOSIS — I50.9 HEART FAILURE, UNSPECIFIED HF CHRONICITY, UNSPECIFIED HEART FAILURE TYPE: ICD-10-CM

## 2025-08-21 DIAGNOSIS — R73.9 ELEVATED SERUM GLUCOSE: ICD-10-CM

## 2025-08-21 DIAGNOSIS — E03.9 HYPOTHYROIDISM, UNSPECIFIED TYPE: ICD-10-CM

## 2025-08-21 DIAGNOSIS — E78.49 OTHER HYPERLIPIDEMIA: ICD-10-CM

## 2025-08-21 DIAGNOSIS — R73.03 PREDIABETES: Primary | ICD-10-CM

## 2025-08-21 DIAGNOSIS — Z71.85 VACCINE COUNSELING: ICD-10-CM

## 2025-08-21 LAB — POC HEMOGLOBIN A1C: 5.5 % (ref 4.2–6.5)

## 2025-08-21 PROCEDURE — 90750 HZV VACC RECOMBINANT IM: CPT | Performed by: STUDENT IN AN ORGANIZED HEALTH CARE EDUCATION/TRAINING PROGRAM

## 2025-08-21 PROCEDURE — 83036 HEMOGLOBIN GLYCOSYLATED A1C: CPT | Mod: QW | Performed by: STUDENT IN AN ORGANIZED HEALTH CARE EDUCATION/TRAINING PROGRAM

## 2025-08-21 PROCEDURE — 99214 OFFICE O/P EST MOD 30 MIN: CPT | Mod: 25 | Performed by: STUDENT IN AN ORGANIZED HEALTH CARE EDUCATION/TRAINING PROGRAM

## 2025-08-21 ASSESSMENT — PAIN SCALES - GENERAL: PAINLEVEL_OUTOF10: 0-NO PAIN

## 2025-10-30 ENCOUNTER — APPOINTMENT (OUTPATIENT)
Dept: RADIOLOGY | Facility: HOSPITAL | Age: 55
End: 2025-10-30
Payer: MEDICARE

## 2025-10-31 ENCOUNTER — APPOINTMENT (OUTPATIENT)
Dept: HEMATOLOGY/ONCOLOGY | Facility: CLINIC | Age: 55
End: 2025-10-31
Payer: MEDICARE

## 2025-11-04 ENCOUNTER — APPOINTMENT (OUTPATIENT)
Dept: HEMATOLOGY/ONCOLOGY | Facility: CLINIC | Age: 55
End: 2025-11-04
Payer: MEDICARE

## 2026-07-27 ENCOUNTER — APPOINTMENT (OUTPATIENT)
Dept: RHEUMATOLOGY | Facility: CLINIC | Age: 56
End: 2026-07-27
Payer: MEDICARE